# Patient Record
Sex: MALE | Race: WHITE | NOT HISPANIC OR LATINO | Employment: FULL TIME | URBAN - METROPOLITAN AREA
[De-identification: names, ages, dates, MRNs, and addresses within clinical notes are randomized per-mention and may not be internally consistent; named-entity substitution may affect disease eponyms.]

---

## 2019-07-06 ENCOUNTER — OFFICE VISIT (OUTPATIENT)
Dept: URGENT CARE | Facility: CLINIC | Age: 54
End: 2019-07-06
Payer: COMMERCIAL

## 2019-07-06 VITALS
BODY MASS INDEX: 34.27 KG/M2 | WEIGHT: 244.8 LBS | HEIGHT: 71 IN | DIASTOLIC BLOOD PRESSURE: 78 MMHG | HEART RATE: 47 BPM | OXYGEN SATURATION: 97 % | RESPIRATION RATE: 16 BRPM | SYSTOLIC BLOOD PRESSURE: 138 MMHG | TEMPERATURE: 97.4 F

## 2019-07-06 DIAGNOSIS — H60.91 OTITIS EXTERNA OF RIGHT EAR, UNSPECIFIED CHRONICITY, UNSPECIFIED TYPE: Primary | ICD-10-CM

## 2019-07-06 DIAGNOSIS — H65.03 BILATERAL ACUTE SEROUS OTITIS MEDIA, RECURRENCE NOT SPECIFIED: ICD-10-CM

## 2019-07-06 DIAGNOSIS — H61.21 IMPACTED CERUMEN OF RIGHT EAR: ICD-10-CM

## 2019-07-06 PROCEDURE — 99203 OFFICE O/P NEW LOW 30 MIN: CPT | Performed by: EMERGENCY MEDICINE

## 2019-07-06 PROCEDURE — 69209 REMOVE IMPACTED EAR WAX UNI: CPT | Performed by: EMERGENCY MEDICINE

## 2019-07-06 RX ORDER — NEOMYCIN SULFATE, POLYMYXIN B SULFATE AND HYDROCORTISONE 10; 3.5; 1 MG/ML; MG/ML; [USP'U]/ML
4 SUSPENSION/ DROPS AURICULAR (OTIC) 4 TIMES DAILY
Qty: 10 ML | Refills: 0 | Status: SHIPPED | OUTPATIENT
Start: 2019-07-06 | End: 2021-01-21 | Stop reason: ALTCHOICE

## 2019-07-06 NOTE — PATIENT INSTRUCTIONS
Ear drops - 4 drops R ear 4 x a day; May try Claritin D, Allegra D or Zyrtec D for congestion/ringing in ear, recheck as needed

## 2019-07-06 NOTE — PROGRESS NOTES
Assessment/Plan:    No problem-specific Assessment & Plan notes found for this encounter  Diagnoses and all orders for this visit:    Otitis externa of right ear, unspecified chronicity, unspecified type  -     neomycin-polymyxin-hydrocortisone (CORTISPORIN) 0 35%-10,000 units/mL-1% otic suspension; Administer 4 drops to the right ear 4 (four) times a day    Bilateral acute serous otitis media, recurrence not specified    Impacted cerumen of right ear    Other orders  -     Ear cerumen removal          Subjective:      Patient ID: Forest Clark is a 48 y o  male  Pain in R ear, ringing in both ears for several days    Earache    There is pain in the right ear  This is a new problem  The current episode started in the past 7 days  The problem occurs constantly  The problem has been unchanged  There has been no fever  The pain is at a severity of 3/10  The pain is mild  Associated symptoms comments: tinnitus  He has tried nothing for the symptoms  The treatment provided no relief  The following portions of the patient's history were reviewed and updated as appropriate: allergies, current medications, past family history, past medical history, past social history, past surgical history and problem list     Review of Systems   HENT: Positive for ear pain and tinnitus  All other systems reviewed and are negative  Objective:      /78   Pulse (!) 47   Temp (!) 97 4 °F (36 3 °C) (Tympanic)   Resp 16   Ht 5' 11" (1 803 m)   Wt 111 kg (244 lb 12 8 oz)   SpO2 97%   BMI 34 14 kg/m²          Physical Exam   Constitutional: He is oriented to person, place, and time  He appears well-developed and well-nourished  HENT:   Right Ear: There is drainage  A middle ear effusion is present  Decreased hearing is noted  Left Ear: External ear and ear canal normal  A middle ear effusion is present     Nose: Nose normal    R canal swollen, occluded with wax, exudate   Eyes: Pupils are equal, round, and reactive to light  Cardiovascular: Normal rate  Pulmonary/Chest: Effort normal    Neurological: He is alert and oriented to person, place, and time  Nursing note and vitals reviewed  Ear cerumen removal  Date/Time: 7/6/2019 1:35 PM  Performed by: Derotha Nageotte, MD  Authorized by: Derotha Nageotte, MD     Patient location:  Bedside  Other Assisting Provider: No    Consent:     Consent obtained:  Verbal    Alternatives discussed:  No treatment  Universal protocol:     Patient identity confirmed:  Verbally with patient  Procedure details:     Location:  R ear    Procedure type: irrigation      Approach:  Natural orifice  Post-procedure details:     Complication:  None    Hearing quality:  Improved    Patient tolerance of procedure:   Tolerated well, no immediate complications

## 2019-10-07 ENCOUNTER — APPOINTMENT (OUTPATIENT)
Dept: RADIOLOGY | Facility: CLINIC | Age: 54
End: 2019-10-07
Payer: COMMERCIAL

## 2019-10-07 VITALS
DIASTOLIC BLOOD PRESSURE: 84 MMHG | BODY MASS INDEX: 34.44 KG/M2 | WEIGHT: 246 LBS | HEIGHT: 71 IN | HEART RATE: 43 BPM | SYSTOLIC BLOOD PRESSURE: 146 MMHG

## 2019-10-07 DIAGNOSIS — M25.531 PAIN IN RIGHT WRIST: ICD-10-CM

## 2019-10-07 DIAGNOSIS — M19.031 ARTHRITIS OF RIGHT WRIST: Primary | ICD-10-CM

## 2019-10-07 PROCEDURE — 73110 X-RAY EXAM OF WRIST: CPT

## 2019-10-07 PROCEDURE — 99203 OFFICE O/P NEW LOW 30 MIN: CPT | Performed by: ORTHOPAEDIC SURGERY

## 2019-10-07 NOTE — PROGRESS NOTES
ASSESSMENT/PLAN:    Assessment:   Right STT joint arthritis     Plan:    The patient will be fitted with the right Comfort Cool brace today which she was instructed to wear for activities  After the patient was shown his Comfort Cool brace he decided he did not want this  He will meet with our hand therapist today for home exercise program for wrist range of motion strengthening as well as a hand based thumb spica splint to wear for heavier activities  We did discuss possible steroid injections with the patient would not like to proceed with today as well as a fusion if needed in the future  Follow Up:  PRN    To Do Next Visit:       General Discussions:     Osteoarthritis:  The anatomy and physiology of osteoarthritis was discussed with the patient today in the office  Deterioration of the articular cartilage eventually leads to altered mobility at the joint, resulting in joint subluxation, osteophyte formation, cystic changes, as well as subchondral sclerosis  Eventually, pain, limited mobility, and compensatory hypermobility at surrounding joints may develop  While normal activity and usage of the joint may provide a painful experience to the patient, this typically does not result in damage to the limb  Treatment options include splints to decreased joint edema, pain, and inflammation  Therapy exercises to strengthen the surrounding musculature may relieve pain, but do not alter the overall continued development of osteoarthritis  Oral medications, topical medications, corticosteroid injections may decrease pain and increase overall function  Eventually, some patients may require surgical intervention       Operative Discussions:         _____________________________________________________  CHIEF COMPLAINT:  Chief Complaint   Patient presents with    Right Wrist - Pain         SUBJECTIVE:  Ernestina Mortimer is a 48 y o  male who presents with Pain  Moderate  Intermittant  Sharp to the right wrist  This started  2 month(s) ago as Chronic  He denies numbness and tingling  Patient is a heavy  for work  Patient states that he has tried bracing in the past without relief  Radiation: Yes to the  wrist  Previous Treatments: NSAIDs with only partial relief  Associated symptoms: No Complaints    PAST MEDICAL HISTORY:  History reviewed  No pertinent past medical history  PAST SURGICAL HISTORY:  Past Surgical History:   Procedure Laterality Date    KNEE SURGERY      SHOULDER SURGERY         FAMILY HISTORY:  History reviewed  No pertinent family history  SOCIAL HISTORY:  Social History     Tobacco Use    Smoking status: Never Smoker    Smokeless tobacco: Never Used   Substance Use Topics    Alcohol use: Not Currently    Drug use: Not Currently       MEDICATIONS:    Current Outpatient Medications:     neomycin-polymyxin-hydrocortisone (CORTISPORIN) 0 35%-10,000 units/mL-1% otic suspension, Administer 4 drops to the right ear 4 (four) times a day (Patient not taking: Reported on 10/7/2019), Disp: 10 mL, Rfl: 0    ALLERGIES:  Allergies   Allergen Reactions    Etodolac Dizziness       REVIEW OF SYSTEMS:  Pertinent items are noted in HPI      LABS:  HgA1c: No results found for: HGBA1C  BMP: No results found for: GLUCOSE, CALCIUM, NA, K, CO2, CL, BUN, CREATININE      _____________________________________________________  PHYSICAL EXAMINATION:  Vital signs: /84   Pulse (!) 43   Ht 5' 11" (1 803 m)   Wt 112 kg (246 lb)   BMI 34 31 kg/m²   General: well developed and well nourished, alert, oriented times 3 and appears comfortable  Psychiatric: Normal  HEENT: Trachea Midline, No torticollis  Cardiovascular: No discernable arrhythmia  Pulmonary: No wheezing or stridor  Skin: No masses, erythema, lacerations, fluctation, ulcerations  Neurovascular: Sensation Intact to the Median, Ulnar, Radial Nerve, Motor Intact to the Median, Ulnar, Radial Nerve and Pulses Intact    MUSCULOSKELETAL EXAMINATION:  RIGHT SIDE:  Wrist:  Mild anatomical snuff box swelling, full wrist flexion and extension, negative Finkelstein, negative thumb cmc tenderness, tenderness STT jt      _____________________________________________________  STUDIES REVIEWED:  Images were reviewd in PACS: xray of right wrist on 10/7/19 demonstrates arthritic changes to STT joint arthritis with joint space narrowing and presence of a scaphoid cyst        PROCEDURES PERFORMED:  Procedures  No Procedures performed today   Scribe Attestation    I,:   Angelique Calles am acting as a scribe while in the presence of the attending physician :        I,:   Pearl Almazan MD personally performed the services described in this documentation    as scribed in my presence :

## 2019-10-14 ENCOUNTER — OFFICE VISIT (OUTPATIENT)
Dept: OCCUPATIONAL THERAPY | Facility: CLINIC | Age: 54
End: 2019-10-14
Payer: COMMERCIAL

## 2019-10-14 DIAGNOSIS — M19.031 ARTHRITIS OF RIGHT WRIST: ICD-10-CM

## 2019-10-14 PROCEDURE — 97760 ORTHOTIC MGMT&TRAING 1ST ENC: CPT | Performed by: OCCUPATIONAL THERAPIST

## 2019-10-14 NOTE — PROGRESS NOTES
Orthosis    Diagnosis:   1  Arthritis of right wrist  Ambulatory referral to PT/OT hand therapy    STT joint     Indication: Joint Protection and Arthritic Condition    Location: Right  thumb  Supplies: Custom Fit Orthotic  Orthosis type: Hand-Based SPICA  Wearing Schedule: As Needed  Describe Position: function    Precautions: Universal (skin contact/breakdown)    Patient or Caregiver expresses understanding of wearing Schedule and Precautions? Yes  Patient or Caregiver able to don/doff orthotic independently? Yes    Written orders provided to patient?  Yes  Orders Obtained: Written  Orders Obtained from: Dr Mia Cui    Return for evaluation and treatment No

## 2020-02-05 ENCOUNTER — OFFICE VISIT (OUTPATIENT)
Dept: URGENT CARE | Facility: CLINIC | Age: 55
End: 2020-02-05
Payer: COMMERCIAL

## 2020-02-05 VITALS
BODY MASS INDEX: 33.86 KG/M2 | DIASTOLIC BLOOD PRESSURE: 80 MMHG | WEIGHT: 250 LBS | HEIGHT: 72 IN | OXYGEN SATURATION: 98 % | SYSTOLIC BLOOD PRESSURE: 138 MMHG | RESPIRATION RATE: 16 BRPM | TEMPERATURE: 97.6 F | HEART RATE: 51 BPM

## 2020-02-05 DIAGNOSIS — H60.501 ACUTE OTITIS EXTERNA OF RIGHT EAR, UNSPECIFIED TYPE: Primary | ICD-10-CM

## 2020-02-05 PROCEDURE — 99213 OFFICE O/P EST LOW 20 MIN: CPT | Performed by: PHYSICIAN ASSISTANT

## 2020-02-05 RX ORDER — CIPROFLOXACIN AND DEXAMETHASONE 3; 1 MG/ML; MG/ML
4 SUSPENSION/ DROPS AURICULAR (OTIC) 2 TIMES DAILY
Qty: 7.5 ML | Refills: 0 | Status: SHIPPED | OUTPATIENT
Start: 2020-02-05 | End: 2021-01-21 | Stop reason: ALTCHOICE

## 2020-02-05 RX ORDER — CIPROFLOXACIN AND DEXAMETHASONE 3; 1 MG/ML; MG/ML
4 SUSPENSION/ DROPS AURICULAR (OTIC) 2 TIMES DAILY
Qty: 7.5 ML | Refills: 0 | Status: SHIPPED | OUTPATIENT
Start: 2020-02-05 | End: 2020-02-05

## 2020-02-05 NOTE — PROGRESS NOTES
NAME: Josafat Peña is a 48 y o  male  : 1965    MRN: [id number]      Assessment and Plan   Acute otitis externa of right ear, unspecified type [H60 501]  1  Acute otitis externa of right ear, unspecified type  ciprofloxacin-dexamethasone (CIPRODEX) otic suspension    Exam findings consistent with otitis externa  At this time will provide patient with Ciprodex  Take medication as noted  Advised patient to keep the ears dry  Recommend use of cotton ball while in shower  Recommended use of OTC Tylenol or ibuprofen for pain  Patient understands and agrees with treatment plans  Tereza Marquez was seen today for earache  Diagnoses and all orders for this visit:    Acute otitis externa of right ear, unspecified type  -     ciprofloxacin-dexamethasone (CIPRODEX) otic suspension; Administer 4 drops to the right ear 2 (two) times a day for 7 days        Patient Instructions   There are no Patient Instructions on file for this visit  Proceed to ER if symptoms worsen  Chief Complaint     Chief Complaint   Patient presents with    Earache     Pt reports right ear pain x1 week  History of Present Illness     55yo Pt presents c/o right ear infection x 1 wk  Pt reports ear pain rated 3-4/10  Pt admits to Has taken OTC hydrogen peroxide  Pt admits to itchy ear, ringing in ear    Denies discharge from ear  Denies trouble hearing  Denies nasal congestion, rhinorrhea, sinus pressure, sore throat, cough  Denies chest tightness, chest pain, SOB, n/v/d  Admits History of ear infection        Review of Systems   Review of Systems   Constitutional: Negative  HENT: Positive for ear pain (Right)  Respiratory: Negative  Cardiovascular: Negative            Current Medications       Current Outpatient Medications:     ciprofloxacin-dexamethasone (CIPRODEX) otic suspension, Administer 4 drops to the right ear 2 (two) times a day for 7 days, Disp: 7 5 mL, Rfl: 0    neomycin-polymyxin-hydrocortisone (CORTISPORIN) 0 35%-10,000 units/mL-1% otic suspension, Administer 4 drops to the right ear 4 (four) times a day (Patient not taking: Reported on 10/7/2019), Disp: 10 mL, Rfl: 0    Current Allergies     Allergies as of 02/05/2020 - Reviewed 02/05/2020   Allergen Reaction Noted    Etodolac Dizziness 03/12/2010              No past medical history on file  Past Surgical History:   Procedure Laterality Date    KNEE SURGERY      SHOULDER SURGERY         No family history on file  Medications have been verified  The following portions of the patient's history were reviewed and updated as appropriate: allergies, current medications, past family history, past medical history, past social history, past surgical history and problem list     Objective   /80   Pulse (!) 51   Temp 97 6 °F (36 4 °C)   Resp 16   Ht 6' (1 829 m)   Wt 113 kg (250 lb)   SpO2 98%   BMI 33 91 kg/m²      Physical Exam     Physical Exam   Constitutional: He appears well-developed  No distress  HENT:   Head: Normocephalic  Right Ear: Hearing and tympanic membrane normal  There is swelling and tenderness  Left Ear: Hearing, tympanic membrane, external ear and ear canal normal    Ears:    Nose: Nose normal    Cardiovascular: Normal rate, regular rhythm, normal heart sounds and intact distal pulses  Exam reveals no gallop and no friction rub  No murmur heard  Pulmonary/Chest: Effort normal and breath sounds normal  No stridor  No respiratory distress  He has no wheezes  He has no rales  He exhibits no tenderness  Nursing note and vitals reviewed        Rima Ramos PA-C

## 2021-01-20 ENCOUNTER — HOSPITAL ENCOUNTER (EMERGENCY)
Facility: HOSPITAL | Age: 56
Discharge: HOME | End: 2021-01-20
Attending: EMERGENCY MEDICINE | Admitting: EMERGENCY MEDICINE
Payer: COMMERCIAL

## 2021-01-20 ENCOUNTER — OFFICE VISIT (OUTPATIENT)
Dept: URGENT CARE | Facility: CLINIC | Age: 56
End: 2021-01-20
Payer: COMMERCIAL

## 2021-01-20 ENCOUNTER — APPOINTMENT (EMERGENCY)
Dept: NON INVASIVE DIAGNOSTICS | Facility: HOSPITAL | Age: 56
End: 2021-01-20
Payer: COMMERCIAL

## 2021-01-20 VITALS
BODY MASS INDEX: 33.86 KG/M2 | RESPIRATION RATE: 16 BRPM | HEIGHT: 72 IN | SYSTOLIC BLOOD PRESSURE: 134 MMHG | HEART RATE: 50 BPM | TEMPERATURE: 97.9 F | DIASTOLIC BLOOD PRESSURE: 82 MMHG | OXYGEN SATURATION: 99 % | WEIGHT: 250 LBS

## 2021-01-20 VITALS
WEIGHT: 250 LBS | TEMPERATURE: 98 F | OXYGEN SATURATION: 97 % | SYSTOLIC BLOOD PRESSURE: 119 MMHG | DIASTOLIC BLOOD PRESSURE: 70 MMHG | HEART RATE: 57 BPM | RESPIRATION RATE: 16 BRPM | BODY MASS INDEX: 33.91 KG/M2

## 2021-01-20 DIAGNOSIS — R60.0 EDEMA OF RIGHT LOWER LEG DUE TO PERIPHERAL VENOUS INSUFFICIENCY: ICD-10-CM

## 2021-01-20 DIAGNOSIS — M79.604 RIGHT LEG PAIN: Primary | ICD-10-CM

## 2021-01-20 DIAGNOSIS — M79.609 PAIN IN LIMB: ICD-10-CM

## 2021-01-20 DIAGNOSIS — I87.2 EDEMA OF RIGHT LOWER LEG DUE TO PERIPHERAL VENOUS INSUFFICIENCY: ICD-10-CM

## 2021-01-20 PROBLEM — I83.891 SYMPTOMATIC VARICOSE VEINS OF RIGHT LOWER EXTREMITY: Status: ACTIVE | Noted: 2021-01-20

## 2021-01-20 PROCEDURE — 93971 EXTREMITY STUDY: CPT

## 2021-01-20 PROCEDURE — 99283 EMERGENCY DEPT VISIT LOW MDM: CPT

## 2021-01-20 PROCEDURE — 93971 EXTREMITY STUDY: CPT | Performed by: INTERNAL MEDICINE

## 2021-01-20 PROCEDURE — 99213 OFFICE O/P EST LOW 20 MIN: CPT | Performed by: NURSE PRACTITIONER

## 2021-01-20 PROCEDURE — 99284 EMERGENCY DEPT VISIT MOD MDM: CPT | Performed by: EMERGENCY MEDICINE

## 2021-01-20 NOTE — ED PROVIDER NOTES
History  Chief Complaint   Patient presents with    Leg Pain     c/o an episode of right leg pain last night around 2200  Pt states leg is sore today  53-year-old male with history of chronic lower extremity venous insufficiency presents for evaluation of worsening pain in his right medial thigh that started last night  Patient reports he has a prior history of leg pain secondary to his venous insufficiency and has had a negative DVT study in the past   Patient states this episode started last night, sudden and was much more severe than his typical episodes  Patient states the pain has improved this morning but is still present mildly  Denies chest pain, shortness of breath  Prior to Admission Medications   Prescriptions Last Dose Informant Patient Reported? Taking?   ciprofloxacin-dexamethasone (CIPRODEX) otic suspension   No No   Sig: Administer 4 drops to the right ear 2 (two) times a day for 7 days   neomycin-polymyxin-hydrocortisone (CORTISPORIN) 0 35%-10,000 units/mL-1% otic suspension   No No   Sig: Administer 4 drops to the right ear 4 (four) times a day   Patient not taking: Reported on 10/7/2019      Facility-Administered Medications: None       History reviewed  No pertinent past medical history  Past Surgical History:   Procedure Laterality Date    KNEE SURGERY      SHOULDER SURGERY         History reviewed  No pertinent family history  I have reviewed and agree with the history as documented  E-Cigarette/Vaping    E-Cigarette Use Never User      E-Cigarette/Vaping Substances    Nicotine No     THC No     CBD No     Flavoring No     Other No     Unknown No      Social History     Tobacco Use    Smoking status: Never Smoker    Smokeless tobacco: Never Used   Substance Use Topics    Alcohol use: Not Currently    Drug use: Not Currently       Review of Systems   Respiratory: Negative for shortness of breath  Cardiovascular: Negative for chest pain  Musculoskeletal:        Right medial thigh pain   Skin: Negative for color change and wound  All other systems reviewed and are negative  Physical Exam  Physical Exam  Vitals signs and nursing note reviewed  Constitutional:       Appearance: He is well-developed  HENT:      Head: Normocephalic and atraumatic  Eyes:      Conjunctiva/sclera: Conjunctivae normal    Neck:      Musculoskeletal: Normal range of motion and neck supple  Cardiovascular:      Rate and Rhythm: Normal rate and regular rhythm  Pulmonary:      Effort: Pulmonary effort is normal  No respiratory distress  Abdominal:      Palpations: Abdomen is soft  Tenderness: There is no abdominal tenderness  Musculoskeletal: Normal range of motion  General: Tenderness present  Comments: Tender to palpation of right medial thigh  Nontender in calf or popliteal region  No overlying skin changes  Intact distal pulses  Skin:     General: Skin is warm  Findings: No erythema  Neurological:      Mental Status: He is alert and oriented to person, place, and time     Psychiatric:         Behavior: Behavior normal          Vital Signs  ED Triage Vitals [01/20/21 1629]   Temperature Pulse Respirations Blood Pressure SpO2   98 °F (36 7 °C) 57 16 119/70 97 %      Temp Source Heart Rate Source Patient Position - Orthostatic VS BP Location FiO2 (%)   Tympanic Monitor Lying Right arm --      Pain Score       1           Vitals:    01/20/21 1629   BP: 119/70   Pulse: 57   Patient Position - Orthostatic VS: Lying         Visual Acuity      ED Medications  Medications - No data to display    Diagnostic Studies  Results Reviewed     None                 VAS lower limb venous duplex study, unilateral/limited    (Results Pending)              Procedures  Procedures         ED Course                             SBIRT 22yo+      Most Recent Value   SBIRT (22 yo +)   In order to provide better care to our patients, we are screening all of our patients for alcohol and drug use  Would it be okay to ask you these screening questions? Yes Filed at: 01/20/2021 1648   Initial Alcohol Screen: US AUDIT-C    1  How often do you have a drink containing alcohol?  0 Filed at: 01/20/2021 1648   2  How many drinks containing alcohol do you have on a typical day you are drinking? 0 Filed at: 01/20/2021 1648   3a  Male UNDER 65: How often do you have five or more drinks on one occasion? 0 Filed at: 01/20/2021 1648   3b  FEMALE Any Age, or MALE 65+: How often do you have 4 or more drinks on one occassion? 0 Filed at: 01/20/2021 1648   Audit-C Score  0 Filed at: 01/20/2021 1648   ALISA: How many times in the past year have you    Used an illegal drug or used a prescription medication for non-medical reasons? Never Filed at: 01/20/2021 1648                    MDM  Number of Diagnoses or Management Options  Diagnosis management comments: 72-year-old male with unilateral leg pain  Obtain duplex assess DVT  Offered pain control patient declined at this time  Disposition  Final diagnoses:   Right leg pain     Time reflects when diagnosis was documented in both MDM as applicable and the Disposition within this note     Time User Action Codes Description Comment    1/20/2021  5:42 PM Wilhemenia Scale Add [M79 609] Pain in limb     1/20/2021  5:58  [address] [A26 785] Right leg pain       ED Disposition     ED Disposition Condition Date/Time Comment    Discharge Stable Wed Jan 20, 2021  5:58 PM Amanda Perez discharge to home/self care              Follow-up Information     Follow up With Specialties Details Why [address] [place] [address] Rd Vascular Surgery   [address] [facility] [id number]  [phone] The [address] [place] [address] Rd, [address] [place], [place], [address] [place] [address], [place] [address] Emergency Department Emergency Medicine  If symptoms worsen [address] [place] [address]  [address] [facility] Department, [address] [facility], [address]          Patient's Medications   Discharge Prescriptions    No medications on file     No discharge procedures on file      PDMP Review     None          ED Provider  Electronically Signed by           Faustino Ji DO  01/20/21 9468

## 2021-01-20 NOTE — PATIENT INSTRUCTIONS
I recommend being seen in the ER today to rule out a blood clot  Even though you have had chronic issues for the last 10 years, the significant pain last night is brand new  Your other scan was 10 years ago  The new symptoms definitely warrant another evaluation and scan to make sure this is not a clot  Clots can be life-threatening since they can go to dangerous placed (brain, heart, lungs, etc)  If you do not have a DVT and/or if you want to follow-up re: the chronic venous issues, see vascular as discussed  Deep Vein Thrombosis   AMBULATORY CARE:   A deep vein thrombosis (DVT)  is a blood clot that forms in a deep vein of the body  The deep veins in the legs, thighs, and hips are the most common sites for DVT  A DVT can also occur in a deep vein within your arms  The clot prevents the normal flow of blood in the vein  The blood backs up and causes pain and swelling  The DVT can break into smaller pieces and travel to your lungs and cause a blockage called a pulmonary embolism  A pulmonary embolism can become life-threatening  Common symptoms include the following:   · Swelling    · Redness    · Warmth, pain, or tenderness     Call your local emergency number (911 in the [address] [facility]) if:   · You feel lightheaded, short of breath, and have chest pain  · You cough up blood  Call your doctor if:   · Your arm or leg feels warm, tender, and painful  It may look swollen and red  · You have questions or concerns about your condition or care  Treatment for a DVT  may include any of the following:  · Blood thinners  help prevent blood clots  Clots can cause strokes, heart attacks, and death  The following are general safety guidelines to follow while you are taking a blood thinner:    ? Watch for bleeding and bruising while you take blood thinners  Watch for bleeding from your gums or nose  Watch for blood in your urine and bowel movements   Use a soft washcloth on your skin, and a soft toothbrush to brush your teeth  This can keep your skin and gums from bleeding  If you shave, use an electric shaver  Do not play contact sports  ? Tell your dentist and other healthcare providers that you take a blood thinner  Wear a bracelet or necklace that says you take this medicine  ? Do not start or stop any other medicines unless your healthcare provider tells you to  Many medicines cannot be used with blood thinners  ? Take your blood thinner exactly as prescribed by your healthcare provider  Do not skip does or take less than prescribed  Tell your provider right away if you forget to take your blood thinner, or if you take too much  ? Warfarin  is a blood thinner that you may need to take  The following are things you should be aware of if you take warfarin:     § Foods and medicines can affect the amount of warfarin in your blood  Do not make major changes to your diet while you take warfarin  Warfarin works best when you eat about the same amount of vitamin K every day  Vitamin K is found in green leafy vegetables and certain other foods  Ask for more information about what to eat when you are taking warfarin  § You will need to see your healthcare provider for follow-up visits when you are on warfarin  You will need regular blood tests  These tests are used to decide how much medicine you need  · Clot busters  are emergency medicines that work to dissolve blood clots  · A vena cava filter  may be placed inside your vena cava to treat your DVT  The vena cava is a large vein that brings blood from your lower body up to your heart  The filter may help trap pieces of a blood clot and prevent them from going into your lungs  · Surgery  called a thrombectomy may be done to remove the clot  A procedure called thrombolysis may instead be done to inject a clot buster that helps break the clot apart  Manage a DVT:   · Wear pressure stockings as directed  The stockings put pressure on your legs   This improves blood flow and helps prevent clots  Wear the stockings during the day  Do not wear them when you sleep  · Elevate your legs above the level of your heart  Elevate your legs when you sit or lie down, as often as you can  This will help decrease swelling and pain  Prop your legs on pillows or blankets to keep them elevated comfortably  Prevent a DVT:   · Exercise regularly to help increase your blood flow  Walking is a good low-impact exercise  Talk to your healthcare provider about the best exercise plan for you  · Change your body position or move around often  Move and stretch in your seat several times each hour if you travel by car or work at a desk  In an airplane, get up and walk every hour  Move your legs by tightening and releasing your leg muscles while sitting  You can move your legs while sitting by raising and lowering your heels  Keep your toes on the floor while you do this  You can also raise and lower your toes while keeping your heels on the floor  · Maintain a healthy weight  Ask your healthcare provider how much you should weigh  Ask him or her to help you create a weight loss plan if you are overweight  · Do not smoke  Nicotine and other chemicals in cigarettes and cigars can damage blood vessels and make it more difficult to manage your DVT  Ask your healthcare provider for information if you currently smoke and need help to quit  E-cigarettes or smokeless tobacco still contain nicotine  Talk to your healthcare provider before you use these products  · Ask about birth control if you are a woman who takes the pill  A birth control pill increases the risk for PE in certain women  The risk is higher if you are also older than 35, smoke cigarettes, or have a blood clotting disorder  Talk to your healthcare provider about other ways to prevent pregnancy, such as a cervical cap or intrauterine device (IUD)      Follow up with your doctor or specialist as directed: You may need to come in regularly for scans to check for blood clots  Your blood may checked to see how long it takes to clot  Your doctor or specialist will tell you if you need to have this test and how often to have it  Write down your questions so you remember to ask them during your visits  © Copyright [address] Information is for End User's use only and may not be sold, redistributed or otherwise used for commercial purposes  All illustrations and images included in CareNotes® are the copyrighted property of [employer]  or [facility]   The above information is an  only  It is not intended as medical advice for individual conditions or treatments  Talk to your doctor, nurse or pharmacist before following any medical regimen to see if it is safe and effective for you  Venous Insufficiency   AMBULATORY CARE:   Venous insufficiency  is a condition that prevents blood from flowing out of your legs and back to your heart  Veins contain valves that help blood flow in one direction  Venous insufficiency means the valves do not close correctly or fully  Blood flows back and pools in your leg  This can cause problems such as varicose veins  Venous insufficiency may also be called chronic venous insufficiency or venous stasis  Common signs and symptoms:   · Visible veins on your legs that may be small and red or large, thick, and blue    · Swelling in your ankles or calves    · Changes in skin color, such as dark or purple skin    · An ulcer (open sore) on your leg    · Leg pain that is worse when you are menstruating (women) or when you stand, and better when you elevate your legs    · Burning or itching    · Cramps that happen at night    · Thick, hard skin on your legs and ankles    · Feeling of heaviness in your legs    Seek care immediately if:   · You have a wound that does not heal or is infected      · You have an injury that has broken your skin and caused your varicose veins to bleed  · Your leg is swollen and hard  · You have pain in your leg that does not go away or gets worse  · Your legs or feet are turning blue or black  · Your leg feels warm, tender, and painful  It may look swollen and red  Contact your healthcare provider if:   · You have a fever  · You have varicose veins and they are painful  · You have new or worsening leg pain, swelling, or redness  · You have new or worsening ulcers or other sores on your leg  · You have questions or concerns about your condition or care  Treatment  may include any of the following:  · Medicine  may be given to improve blood flow  The medicines may thin your blood or reduce swelling to help blood flow  You may also need medicine to treat a bacterial infection  · Ablation  is a procedure used to close varicose veins  A catheter is guided until it is near the vein  A device will then be guided to the area  The device may produce energy through radiofrequency or a laser  The energy creates heat that will close the blood vessel  · Sclerotherapy  is a procedure used to fade visible veins  Your healthcare provider will inject a liquid into a spider vein or varicose vein  The liquid causes irritation in the vein  The vein swells and sticks together  Your body will then absorb the vein  · Surgery  may be needed if other treatments do not work  Surgery may be used to repair a leg vein valve or to clip or tie off a vein so blood cannot flow through it  You may need to have a veins removed during surgery called stripping  Surgery may be used to bypass (go around) the damaged vein  Blood will flow through a vein transplanted from another part of your body  Manage your symptoms:   · Wear pressure stockings as directed  Pressure stockings help keep blood from pooling in your leg veins  Your healthcare provider can prescribe stockings that are right for you   Do not buy over-the-counter pressure stockings unless your healthcare provider says it is okay  They may not fit correctly or may have elastic that cuts off your circulation  Ask your healthcare provider when to start wearing pressure stockings and how long to wear them each day  · Do not sit or stand for long periods of time  If you have to sit for a long time, flex and extend your legs, feet, and ankles  Do this about 10 times every 30 minutes to help keep blood flowing  If you have to stand for a long time, take breaks and sit with your legs elevated  · Elevate your legs  Elevate your legs above the level of your heart to reduce swelling  Your healthcare provider may recommend that you keep your legs elevated for 30 minutes at a time  You may need to do this 3 to 4 times per day, or more if your healthcare provider recommends  · Do not smoke  Nicotine and other chemicals in cigarettes and cigars can cause blood vessel damage  Ask your healthcare provider for information if you currently smoke and need help to quit  E-cigarettes or smokeless tobacco still contain nicotine  Talk to your healthcare provider before you use these products  · Reach or maintain a healthy weight  Extra weight can make venous insufficiency worse  Ask your healthcare provider how much you should weigh  He can help you create a weight loss plan if you need to lose weight  · Exercise as directed  Walking can help increase blood flow in your calves  Ask your healthcare provider how much exercise you need each day and which exercises are best for you  · Care for your skin  Keep your skin clean  Do not use any soaps or lotions that may dry your skin  For example, do not use products that contain fragrance or alcohol  If you have a skin ulcer, your healthcare provider may recommend a wet-to-dry bandage  To do this, apply a wet bandage to your wound and allow it to dry   This will help remove drainage from your wound each time you change the bandage  Your healthcare provider will tell you how often to change your bandage and which kind of bandage to use  Check your wound for signs of infection, such as swelling or pus  · Go to physical therapy (PT) as directed  A physical therapist can help you increase movement and range of motion in your legs  Follow up with your healthcare provider as directed:  Write down your questions so you remember to ask them during your visits  © Copyright [address] Information is for End User's use only and may not be sold, redistributed or otherwise used for commercial purposes  All illustrations and images included in CareNotes® are the copyrighted property of [employer]  or [address] [place] [address]  The above information is an  only  It is not intended as medical advice for individual conditions or treatments  Talk to your doctor, nurse or pharmacist before following any medical regimen to see if it is safe and effective for you

## 2021-01-20 NOTE — PROGRESS NOTES
[employer] [employer]        NAME: Nicolas Blount is a 50 y o  male  : 1965    MRN: [id number]  DATE: 2021  TIME: 11:26 AM    Assessment and Plan   Right leg pain [M79 [id number]]  1  Right leg pain  Ambulatory referral to Vascular Surgery   2  Edema of right lower leg due to peripheral venous insufficiency  Ambulatory referral to Vascular Surgery         Patient Instructions     Patient Instructions     I recommend being seen in the ER today to rule out a blood clot  Even though you have had chronic issues for the last 10 years, the significant pain last night is brand new  Your other scan was 10 years ago  The new symptoms definitely warrant another evaluation and scan to make sure this is not a clot  Clots can be life-threatening since they can go to dangerous placed (brain, heart, lungs, etc)  If you do not have a DVT and/or if you want to follow-up re: the chronic venous issues, see vascular as discussed  Deep Vein Thrombosis   AMBULATORY CARE:   A deep vein thrombosis (DVT)  is a blood clot that forms in a deep vein of the body  The deep veins in the legs, thighs, and hips are the most common sites for DVT  A DVT can also occur in a deep vein within your arms  The clot prevents the normal flow of blood in the vein  The blood backs up and causes pain and swelling  The DVT can break into smaller pieces and travel to your lungs and cause a blockage called a pulmonary embolism  A pulmonary embolism can become life-threatening  Common symptoms include the following:   · Swelling    · Redness    · Warmth, pain, or tenderness     Call your local emergency number (911 in the [address] [facility]) if:   · You feel lightheaded, short of breath, and have chest pain  · You cough up blood  Call your doctor if:   · Your arm or leg feels warm, tender, and painful  It may look swollen and red  · You have questions or concerns about your condition or care      Treatment for a DVT  may include any of the following:  · Blood thinners  help prevent blood clots  Clots can cause strokes, heart attacks, and death  The following are general safety guidelines to follow while you are taking a blood thinner:    ? Watch for bleeding and bruising while you take blood thinners  Watch for bleeding from your gums or nose  Watch for blood in your urine and bowel movements  Use a soft washcloth on your skin, and a soft toothbrush to brush your teeth  This can keep your skin and gums from bleeding  If you shave, use an electric shaver  Do not play contact sports  ? Tell your dentist and other healthcare providers that you take a blood thinner  Wear a bracelet or necklace that says you take this medicine  ? Do not start or stop any other medicines unless your healthcare provider tells you to  Many medicines cannot be used with blood thinners  ? Take your blood thinner exactly as prescribed by your healthcare provider  Do not skip does or take less than prescribed  Tell your provider right away if you forget to take your blood thinner, or if you take too much  ? Warfarin  is a blood thinner that you may need to take  The following are things you should be aware of if you take warfarin:     § Foods and medicines can affect the amount of warfarin in your blood  Do not make major changes to your diet while you take warfarin  Warfarin works best when you eat about the same amount of vitamin K every day  Vitamin K is found in green leafy vegetables and certain other foods  Ask for more information about what to eat when you are taking warfarin  § You will need to see your healthcare provider for follow-up visits when you are on warfarin  You will need regular blood tests  These tests are used to decide how much medicine you need  · Clot busters  are emergency medicines that work to dissolve blood clots  · A vena cava filter  may be placed inside your vena cava to treat your DVT   The vena cava is a large vein that brings blood from your lower body up to your heart  The filter may help trap pieces of a blood clot and prevent them from going into your lungs  · Surgery  called a thrombectomy may be done to remove the clot  A procedure called thrombolysis may instead be done to inject a clot buster that helps break the clot apart  Manage a DVT:   · Wear pressure stockings as directed  The stockings put pressure on your legs  This improves blood flow and helps prevent clots  Wear the stockings during the day  Do not wear them when you sleep  · Elevate your legs above the level of your heart  Elevate your legs when you sit or lie down, as often as you can  This will help decrease swelling and pain  Prop your legs on pillows or blankets to keep them elevated comfortably  Prevent a DVT:   · Exercise regularly to help increase your blood flow  Walking is a good low-impact exercise  Talk to your healthcare provider about the best exercise plan for you  · Change your body position or move around often  Move and stretch in your seat several times each hour if you travel by car or work at a desk  In an airplane, get up and walk every hour  Move your legs by tightening and releasing your leg muscles while sitting  You can move your legs while sitting by raising and lowering your heels  Keep your toes on the floor while you do this  You can also raise and lower your toes while keeping your heels on the floor  · Maintain a healthy weight  Ask your healthcare provider how much you should weigh  Ask him or her to help you create a weight loss plan if you are overweight  · Do not smoke  Nicotine and other chemicals in cigarettes and cigars can damage blood vessels and make it more difficult to manage your DVT  Ask your healthcare provider for information if you currently smoke and need help to quit  E-cigarettes or smokeless tobacco still contain nicotine   Talk to your healthcare provider before you use these products  · Ask about birth control if you are a woman who takes the pill  A birth control pill increases the risk for PE in certain women  The risk is higher if you are also older than 35, smoke cigarettes, or have a blood clotting disorder  Talk to your healthcare provider about other ways to prevent pregnancy, such as a cervical cap or intrauterine device (IUD)  Follow up with your doctor or specialist as directed: You may need to come in regularly for scans to check for blood clots  Your blood may checked to see how long it takes to clot  Your doctor or specialist will tell you if you need to have this test and how often to have it  Write down your questions so you remember to ask them during your visits  © Copyright [address] Information is for End User's use only and may not be sold, redistributed or otherwise used for commercial purposes  All illustrations and images included in CareNotes® are the copyrighted property of [employer] D [employer] M , Inc  or [facility] [employer]   The above information is an  only  It is not intended as medical advice for individual conditions or treatments  Talk to your doctor, nurse or pharmacist before following any medical regimen to see if it is safe and effective for you  Venous Insufficiency   AMBULATORY CARE:   Venous insufficiency  is a condition that prevents blood from flowing out of your legs and back to your heart  Veins contain valves that help blood flow in one direction  Venous insufficiency means the valves do not close correctly or fully  Blood flows back and pools in your leg  This can cause problems such as varicose veins  Venous insufficiency may also be called chronic venous insufficiency or venous stasis    Common signs and symptoms:   · Visible veins on your legs that may be small and red or large, thick, and blue    · Swelling in your ankles or calves    · Changes in skin color, such as dark or purple skin    · An ulcer (open sore) on your leg    · Leg pain that is worse when you are menstruating (women) or when you stand, and better when you elevate your legs    · Burning or itching    · Cramps that happen at night    · Thick, hard skin on your legs and ankles    · Feeling of heaviness in your legs    Seek care immediately if:   · You have a wound that does not heal or is infected  · You have an injury that has broken your skin and caused your varicose veins to bleed  · Your leg is swollen and hard  · You have pain in your leg that does not go away or gets worse  · Your legs or feet are turning blue or black  · Your leg feels warm, tender, and painful  It may look swollen and red  Contact your healthcare provider if:   · You have a fever  · You have varicose veins and they are painful  · You have new or worsening leg pain, swelling, or redness  · You have new or worsening ulcers or other sores on your leg  · You have questions or concerns about your condition or care  Treatment  may include any of the following:  · Medicine  may be given to improve blood flow  The medicines may thin your blood or reduce swelling to help blood flow  You may also need medicine to treat a bacterial infection  · Ablation  is a procedure used to close varicose veins  A catheter is guided until it is near the vein  A device will then be guided to the area  The device may produce energy through radiofrequency or a laser  The energy creates heat that will close the blood vessel  · Sclerotherapy  is a procedure used to fade visible veins  Your healthcare provider will inject a liquid into a spider vein or varicose vein  The liquid causes irritation in the vein  The vein swells and sticks together  Your body will then absorb the vein  · Surgery  may be needed if other treatments do not work  Surgery may be used to repair a leg vein valve or to clip or tie off a vein so blood cannot flow through it   You may need to have a veins removed during surgery called stripping  Surgery may be used to bypass (go around) the damaged vein  Blood will flow through a vein transplanted from another part of your body  Manage your symptoms:   · Wear pressure stockings as directed  Pressure stockings help keep blood from pooling in your leg veins  Your healthcare provider can prescribe stockings that are right for you  Do not buy over-the-counter pressure stockings unless your healthcare provider says it is okay  They may not fit correctly or may have elastic that cuts off your circulation  Ask your healthcare provider when to start wearing pressure stockings and how long to wear them each day  · Do not sit or stand for long periods of time  If you have to sit for a long time, flex and extend your legs, feet, and ankles  Do this about 10 times every 30 minutes to help keep blood flowing  If you have to stand for a long time, take breaks and sit with your legs elevated  · Elevate your legs  Elevate your legs above the level of your heart to reduce swelling  Your healthcare provider may recommend that you keep your legs elevated for 30 minutes at a time  You may need to do this 3 to 4 times per day, or more if your healthcare provider recommends  · Do not smoke  Nicotine and other chemicals in cigarettes and cigars can cause blood vessel damage  Ask your healthcare provider for information if you currently smoke and need help to quit  E-cigarettes or smokeless tobacco still contain nicotine  Talk to your healthcare provider before you use these products  · Reach or maintain a healthy weight  Extra weight can make venous insufficiency worse  Ask your healthcare provider how much you should weigh  He can help you create a weight loss plan if you need to lose weight  · Exercise as directed  Walking can help increase blood flow in your calves   Ask your healthcare provider how much exercise you need each day and which exercises are best for you  · Care for your skin  Keep your skin clean  Do not use any soaps or lotions that may dry your skin  For example, do not use products that contain fragrance or alcohol  If you have a skin ulcer, your healthcare provider may recommend a wet-to-dry bandage  To do this, apply a wet bandage to your wound and allow it to dry  This will help remove drainage from your wound each time you change the bandage  Your healthcare provider will tell you how often to change your bandage and which kind of bandage to use  Check your wound for signs of infection, such as swelling or pus  · Go to physical therapy (PT) as directed  A physical therapist can help you increase movement and range of motion in your legs  Follow up with your healthcare provider as directed:  Write down your questions so you remember to ask them during your visits  © Copyright [address] [place] [address] Drive Information is for End User's use only and may not be sold, redistributed or otherwise used for commercial purposes  All illustrations and images included in CareNotes® are the copyrighted property of [employer] M , Inc  or [address] [place] [address]  The above information is an  only  It is not intended as medical advice for individual conditions or treatments  Talk to your doctor, nurse or pharmacist before following any medical regimen to see if it is safe and effective for you  Follow up with PCP in 3-5 days  Proceed to  ER if symptoms worsen  Chief Complaint     Chief Complaint   Patient presents with    Leg Pain     Pt reports right inner thigh pain that began last night  Reports history of venous problems in his right leg  History of Present Illness        Patient presents concerned about increased right thigh pain  He notes that he has had a longstanding history of venous insufficiency in his right leg    He 1st had a lump with discomfort approximately 10 years ago and was sent by his PCP to the ER where he had ultrasound  The ultrasound was negative for clot, but it did show that the being basically did not run straight but rather would wind around causing blood to pool  He notes that he has venous issues in both legs, worse on the right  He states going up he lived and worked [place] which involved lots of kneeling down and weeding  Since he is right handed, he spent more time kneeling on his right leg  He has had venous lumpiness in that leg for years with a stable lump just above the knee on the right inner thigh  However, last night around 10 pm or so, he experienced sudden significant pain in his right inner thigh over that lump  He states it lasted at least 20 min before he felt able to move, then he sat at the side of the bed for another 5 min  He also reports feeling flushed during this episode  He states that this has never happened before  He did try to reach out to his PCP this morning but has not heard back yet  Concerned, he presents here  We discussed scope of urgent care vs PCP re: ordering outpatient diagnostics or not  We discussed that the negative ultrasound 10 years ago does not mean there could not be a clot there now since his symptoms are different  We discussed that my best advise is that he should go to the ER  He wishes to try his PCP again to see about an outpatient scan and also to see if the vascular referral has any immediate appointments  We discussed that these are both worth trying, although consults are often not immediate  If he cannot obtain outpatient orders, he should reconsider presenting to the ER  Review of Systems   Review of Systems   Cardiovascular: Positive for leg swelling (chronic  New pain over pre-existing venous lump)  All other systems reviewed and are negative          Current Medications       Current Outpatient Medications:     ciprofloxacin-dexamethasone (CIPRODEX) otic suspension, Administer 4 drops to the right ear 2 (two) times a day for 7 days, Disp: 7 5 mL, Rfl: 0    neomycin-polymyxin-hydrocortisone (CORTISPORIN) 0 35%-10,000 units/mL-1% otic suspension, Administer 4 drops to the right ear 4 (four) times a day (Patient not taking: Reported on 10/7/2019), Disp: 10 mL, Rfl: 0    Current Allergies     Allergies as of 01/20/2021 - Reviewed 01/20/2021   Allergen Reaction Noted    Etodolac Dizziness 03/12/2010            The following portions of the patient's history were reviewed and updated as appropriate: allergies, current medications, past family history, past medical history, past social history, past surgical history and problem list      History reviewed  No pertinent past medical history  Past Surgical History:   Procedure Laterality Date    KNEE SURGERY      SHOULDER SURGERY         No family history on file  Medications have been verified  Objective   /82   Pulse (!) 50   Temp 97 9 °F (36 6 °C)   Resp 16   Ht 6' (1 829 m)   Wt 113 kg (250 lb)   SpO2 99%   BMI 33 91 kg/m²        Physical Exam     Physical Exam  Vitals signs and nursing note reviewed  Constitutional:       General: He is not in acute distress  Appearance: He is well-developed  He is not toxic-appearing or diaphoretic  HENT:      Head: Normocephalic and atraumatic  Neck:      Musculoskeletal: Normal range of motion and neck supple  Cardiovascular:      Comments: +1-2 edema RLE stable per patient  Trace on LLE, also stable per patient  Pulmonary:      Effort: Pulmonary effort is normal  No respiratory distress  Abdominal:      General: There is no distension  Palpations: Abdomen is soft  Musculoskeletal: Normal range of motion  Right lower leg: He exhibits deformity (multiple areas of chronic venous insufficiency with enlarge lumpy veins)  Edema present  Legs:    Skin:     General: Skin is warm and dry  Capillary Refill: Capillary refill takes less than 2 seconds  Neurological:      Mental Status: He is alert and oriented to person, place, and time  Psychiatric:         Behavior: Behavior normal          Thought Content:  Thought content normal          Judgment: Judgment normal

## 2021-01-21 ENCOUNTER — OFFICE VISIT (OUTPATIENT)
Dept: VASCULAR SURGERY | Facility: CLINIC | Age: 56
End: 2021-01-21
Payer: COMMERCIAL

## 2021-01-21 VITALS
BODY MASS INDEX: 34.13 KG/M2 | TEMPERATURE: 98.5 F | WEIGHT: 252 LBS | RESPIRATION RATE: 16 BRPM | DIASTOLIC BLOOD PRESSURE: 82 MMHG | SYSTOLIC BLOOD PRESSURE: 132 MMHG | HEART RATE: 98 BPM | HEIGHT: 72 IN

## 2021-01-21 DIAGNOSIS — I80.01 SUPERFICIAL PHLEBITIS OF RIGHT LEG: ICD-10-CM

## 2021-01-21 DIAGNOSIS — I87.2 EDEMA OF RIGHT LOWER LEG DUE TO PERIPHERAL VENOUS INSUFFICIENCY: ICD-10-CM

## 2021-01-21 DIAGNOSIS — I83.891 SYMPTOMATIC VARICOSE VEINS OF RIGHT LOWER EXTREMITY: Primary | ICD-10-CM

## 2021-01-21 DIAGNOSIS — R60.0 EDEMA OF RIGHT LOWER LEG DUE TO PERIPHERAL VENOUS INSUFFICIENCY: ICD-10-CM

## 2021-01-21 DIAGNOSIS — M79.604 RIGHT LEG PAIN: ICD-10-CM

## 2021-01-21 PROCEDURE — 99244 OFF/OP CNSLTJ NEW/EST MOD 40: CPT | Performed by: PHYSICIAN ASSISTANT

## 2021-01-21 RX ORDER — ASPIRIN 325 MG
325 TABLET ORAL AS NEEDED
COMMUNITY
End: 2021-04-14

## 2021-01-21 NOTE — PROGRESS NOTES
Assessment/Plan:    Symptomatic varicose veins of right lower extremity  51 y/o male nonsmoker w/longstanding varicose veins presents with acute episode of painful R medial thigh varicosity  -recommend conservative measures to include daily use of compression stockings, lower extremity elevation, low-sodium diet, aerobic activity, weight management and skin moisturization  -warm compresses PRN to affected area  -NSAIDs PRN for symptomatic relief  -return to office   -instructed to contact the office with any questions, concerns or new symptoms       Diagnoses and all orders for this visit:    Symptomatic varicose veins of right lower extremity  -     Compression Stocking  -     VAS reflux lower limb venous duplex study with reflux assesment, unilateral; Future    Right leg pain  -     Ambulatory referral to Vascular Surgery    Edema of right lower leg due to peripheral venous insufficiency  -     Ambulatory referral to Vascular Surgery    Other orders  -     aspirin 325 mg tablet; Take 325 mg by mouth as needed for mild pain          Subjective:      Patient ID: Lara Carrillo is a 50 y o  male  New patient, presents in office for ED follow up  Patient was seen in the [facility] [address]  ED on 1/20/2021 for R leg pain  Patient had an LEV done on 1/20/2021  Patient reports that the R leg pain has reduced his the ED visit  Patient reports he has a R leg lump that is behind his knee and has been there since 2012  Patient denies any wounds or ulcers  51 y/o male nonsmoker w/longstanding RLE truncal varicosities presents with acute episode of painful R medial thigh varicosity, likely w/phlebitis 1/20/21  Patient evaluated in [address] [facility] and then referred to emergency room yesterday, 1/20/2021, due to an abrupt onset of pain at the site of a chronic bulging of the right medial distal calf which has now resolved    Limited LEVD (2/2 Covid exposure reducation) in the ER reviewed and demonstrate no evidence of acute or chronic bilateral lower extremity DVT  Patient referred to vascular surgery for evaluation  Patient reports a longstanding history of right lower extremity truncal varicosities with significant bulging of the right medial distal thigh since 2012  LEVD 5/21/2012 images reviewed without images of the affected right medial thigh site  No evidence of DVT at that time  Patient complains of chronic right lower leg edema with increased calf circumference and significant truncal varicosities with occasional burning and pain  Patient does not wear compression  The following portions of the patient's history were reviewed and updated as appropriate: allergies, current medications, past family history, past medical history, past social history, past surgical history and problem list     Review of Systems   Constitutional: Negative  Negative for chills and fever  HENT: Negative  Eyes: Negative  Respiratory: Negative  Cardiovascular: Negative  Negative for leg swelling  Gastrointestinal: Negative  Negative for diarrhea, nausea and vomiting  Endocrine: Negative  Genitourinary: Negative  Musculoskeletal: Negative  Negative for gait problem  R leg pain   Skin: Negative  Negative for wound  Allergic/Immunologic: Negative  Neurological: Negative  Hematological: Negative  Psychiatric/Behavioral: Negative  I have reviewed and made appropriate changes to the review of systems input by the medical assistant      Vitals:    01/21/21 1412   BP: 132/82   BP Location: Left arm   Patient Position: Sitting   Cuff Size: Adult   Pulse: 98   Resp: 16   Temp: 98 5 °F (36 9 °C)   TempSrc: Tympanic   Weight: 114 kg (252 lb)   Height: 6' (1 829 m)       Patient Active Problem List   Diagnosis    Symptomatic varicose veins of right lower extremity       Past Surgical History:   Procedure Laterality Date    KNEE SURGERY      SHOULDER SURGERY         Family History   Problem Relation Age of Onset  No Known Problems Family        Social History     Socioeconomic History    Marital status: Single     Spouse name: Not on file    Number of children: Not on file    Years of education: Not on file    Highest education level: Not on file   Occupational History    Not on file   Social Needs    Financial resource strain: Not on file    Food insecurity     Worry: Not on file     Inability: Not on file    Transportation needs     Medical: Not on file     Non-medical: Not on file   Tobacco Use    Smoking status: Never Smoker    Smokeless tobacco: Never Used   Substance and Sexual Activity    Alcohol use: Not Currently    Drug use: Not Currently    Sexual activity: Not on file   Lifestyle    Physical activity     Days per week: Not on file     Minutes per session: Not on file    Stress: Not on file   Relationships    Social connections     Talks on phone: Not on file     Gets together: Not on file     Attends Latter day service: Not on file     Active member of club or organization: Not on file     Attends meetings of clubs or organizations: Not on file     Relationship status: Not on file    Intimate partner violence     Fear of current or ex partner: Not on file     Emotionally abused: Not on file     Physically abused: Not on file     Forced sexual activity: Not on file   Other Topics Concern    Not on file   Social History Narrative    Not on file       Allergies   Allergen Reactions    Etodolac Dizziness and GI Bleeding         Current Outpatient Medications:     aspirin 325 mg tablet, Take 325 mg by mouth as needed for mild pain, Disp: , Rfl:     Objective:  Imaging study:  L EVD 1/20/2021:  Imaging study reviewed and as described above  See full report below:  FINDINGS:     Right    Impression         CFV      Normal (Patent)             CONCLUSION:  Limited study performed to minimize technologist exposure due to possible Covid  transmission       RIGHT LOWER LIMB: Limited  No gross evidence of acute deep vein thrombosis in the CFV, FV, and Popliteal  Vein  LEFT LOWER LIMB: Limited  No gross evidence of acute deep vein thrombosis in the CFV, FV, and Popliteal  Vein    /82 (BP Location: Left arm, Patient Position: Sitting, Cuff Size: Adult)   Pulse 98   Temp 98 5 °F (36 9 °C) (Tympanic)   Resp 16   Ht 6' (1 829 m)   Wt 114 kg (252 lb)   BMI 34 18 kg/m²       RLE    RLE       Physical Exam  Vitals signs and nursing note reviewed  Constitutional:       General: He is not in acute distress  Appearance: He is well-developed  HENT:      Head: Normocephalic and atraumatic  Eyes:      General: No scleral icterus  Conjunctiva/sclera: Conjunctivae normal       Pupils: Pupils are equal, round, and reactive to light  Neck:      Musculoskeletal: Normal range of motion and neck supple  Vascular: No carotid bruit or JVD  Trachea: No tracheal deviation  Cardiovascular:      Rate and Rhythm: Normal rate and regular rhythm  Pulses:           Dorsalis pedis pulses are 2+ on the right side and 2+ on the left side  Posterior tibial pulses are 2+ on the right side and 2+ on the left side  Heart sounds: S1 normal and S2 normal  No murmur  No friction rub  No gallop  No S3 sounds  Comments: Chronic enlargement right calf circumference, 2 times size left  Significant truncal varicosities on right anterior shin, right medial calf and enlarged varicosity of the right medial distal thigh  See Clinical images above  +lipodermatosclerosis  No hemosiderin staining  No venous ulcerations  Bilateral lower extremities warm, pink, motor and sensory intact and well perfused  Pulmonary:      Effort: No respiratory distress  Breath sounds: Normal breath sounds  No stridor  No wheezing, rhonchi or rales  Abdominal:      General: Bowel sounds are normal  There is no distension or abdominal bruit  Palpations: Abdomen is soft   There is no mass or pulsatile mass  Tenderness: There is no abdominal tenderness  There is no rebound  Musculoskeletal: Normal range of motion  General: No deformity  Right lower le+ Edema present  Skin:     General: Skin is warm and dry  Coloration: Skin is not pale  Findings: No erythema or lesion  Neurological:      Mental Status: He is alert and oriented to person, place, and time  Psychiatric:         Mood and Affect: Mood normal          Thought Content:  Thought content normal

## 2021-01-21 NOTE — ASSESSMENT & PLAN NOTE
51 y/o male nonsmoker w/longstanding RLE truncal varicosities presents with acute episode of painful R medial thigh varicosity, likely w/phlebitis 1/20/21  Patient does not wear compression    -recommend conservative measures to include daily use of compression stockings, lower extremity elevation, low-sodium diet, aerobic activity, weight management and skin moisturization  -warm compresses PRN to affected area  -NSAIDs PRN for symptomatic relief  -will obtain LEVDR  -return to office with surgeon with [facility] for re-evaluation  -instructed to contact the office with any questions, concerns or new symptoms

## 2021-01-21 NOTE — PATIENT INSTRUCTIONS
Varicose Veins   WHAT YOU NEED TO KNOW:   What are varicose veins? Varicose veins are veins that become large, twisted, and swollen  They are common on the back of the calves, knees, and thighs  Varicose veins are caused by valves in your veins that do not work properly  This causes blood to collect and increase pressure in the veins of your legs  The increased pressure causes your veins to stretch, get larger, swell, and twist        What increases my risk for varicose veins? · Pregnancy    · A family history of varicose veins    · Being overweight or obese    · Age 48 years or older    · Sitting or standing for long periods of time    · Wearing tight clothing  What are the signs and symptoms of varicose veins? Your symptoms may be worse after you stand or sit for long periods of time  You may have any of the following:  · Blue, purple, or bulging veins in your legs     · Pain, swelling, or muscle cramps in your legs    · Feeling of fatigue or heaviness in your legs  How are varicose veins diagnosed? Your healthcare provider will examine your legs and ask about your medical history  You may need tests, such as a Doppler ultrasound or duplex scan  These tests show your veins and valves, and how your blood is flowing through them  These tests may also show if there is a blockage or blood clot  How are varicose veins treated? The goal of treatment is to decrease symptoms, improve appearance, and prevent further problems  Treatment will depend on which veins are affected and how severe your condition is  You may need procedures to treat or remove your varicose veins  For example, your healthcare provider may inject a solution or use a laser to close the varicose veins  Surgery to remove long veins may also be done  Ask your healthcare provider for more information about procedures used to treat varicose veins  What can I do to manage my symptoms? · Do not sit or stand for long periods of time    This can cause the blood to collect in your legs and make your symptoms worse  Bend or rotate your ankles several times every hour  Walk around for a few minutes every hour to get blood moving in your legs  · Do not cross your legs when you sit  This decreases blood flow to your feet and can make your symptoms worse  · Do not wear tight clothing or shoes  Do not wear high-heeled shoes  Do not wear clothes that are tight around the waist or knees  · Maintain a healthy weight  Being overweight or obese can make your varicose veins worse  Ask your healthcare provider how much you should weigh  Ask him or her to help you create a weight loss plan if you are overweight  · Wear pressure stockings as directed  The stockings are tight and put pressure on your legs  They improve blood flow and help prevent clots  · Elevate your legs  Keep them above the level of your heart for 15 to 30 minutes several times a day  You can also prop the end of your bed up slightly to elevate your legs while you sleep  This will help blood to flow back to your heart  · Get regular exercise  Talk to your healthcare provider about the best exercise plan for you  Exercise can improve blood flow to your legs and feet  When should I seek immediate care? · You have a wound that does not heal or is infected  · You have an injury that has broken your skin and caused your varicose veins to bleed  · Your leg is swollen and hard  · You notice that your legs or feet are turning blue or black  · Your leg feels warm, tender, and painful  It may look swollen and red  When should I contact my healthcare provider? · You have pain in your leg that does not go away or gets worse  · You notice sudden large bruising on your legs  · You have a rash on your leg  · Your symptoms keep you from doing your daily activities  · You have questions or concerns about your condition or care    CARE AGREEMENT:   You have the right to help plan your care  Learn about your health condition and how it may be treated  Discuss treatment options with your caregivers to decide what care you want to receive  You always have the right to refuse treatment  The above information is an  only  It is not intended as medical advice for individual conditions or treatments  Talk to your doctor, nurse or pharmacist before following any medical regimen to see if it is safe and effective for you  © 2017 [employer]  Information is for End User's use only and may not be sold, redistributed or otherwise used for commercial purposes  All illustrations and images included in CareNotes® are the copyrighted property of [employer]  or [address]  -recommend conservative management to include daily use of prescription compression stockings, leg elevation, low-salt diet, aerobic activity, weight management and lotion to leg to help promote good skin health  -place your compression stockings on in the morning and remove prior to bed  You have been provided with a prescription for compression stockings and a list of providers  -we will schedule a lower extremity venous reflux study to evaluate the underlying function of the valves in your veins and determine role for surgical intervention    -recommend use of NSAIDs for relief of pain   -warm compresses to affected area for relief of pain  -return to office with surgeon after reflux study for re-evaluation  -please contact the office with any questions, concerns or new symptoms

## 2021-01-26 ENCOUNTER — HOSPITAL ENCOUNTER (OUTPATIENT)
Dept: NON INVASIVE DIAGNOSTICS | Facility: CLINIC | Age: 56
Discharge: HOME | End: 2021-01-26
Payer: COMMERCIAL

## 2021-01-26 DIAGNOSIS — I83.891 SYMPTOMATIC VARICOSE VEINS OF RIGHT LOWER EXTREMITY: ICD-10-CM

## 2021-01-26 PROCEDURE — 93971 EXTREMITY STUDY: CPT | Performed by: SURGERY

## 2021-01-26 PROCEDURE — 93971 EXTREMITY STUDY: CPT

## 2021-02-10 ENCOUNTER — TELEPHONE (OUTPATIENT)
Dept: VASCULAR SURGERY | Facility: CLINIC | Age: 56
End: 2021-02-10

## 2021-02-11 ENCOUNTER — OFFICE VISIT (OUTPATIENT)
Dept: VASCULAR SURGERY | Facility: CLINIC | Age: 56
End: 2021-02-11
Payer: COMMERCIAL

## 2021-02-11 ENCOUNTER — TELEPHONE (OUTPATIENT)
Dept: ADMINISTRATIVE | Facility: HOSPITAL | Age: 56
End: 2021-02-11

## 2021-02-11 VITALS
DIASTOLIC BLOOD PRESSURE: 80 MMHG | HEIGHT: 72 IN | HEART RATE: 56 BPM | SYSTOLIC BLOOD PRESSURE: 110 MMHG | BODY MASS INDEX: 33.46 KG/M2 | RESPIRATION RATE: 17 BRPM | WEIGHT: 247 LBS | TEMPERATURE: 98.6 F

## 2021-02-11 DIAGNOSIS — I80.01 SUPERFICIAL PHLEBITIS OF RIGHT LEG: ICD-10-CM

## 2021-02-11 DIAGNOSIS — I83.811 VARICOSE VEINS OF RIGHT LOWER EXTREMITY WITH PAIN: Primary | ICD-10-CM

## 2021-02-11 DIAGNOSIS — I83.891 SYMPTOMATIC VARICOSE VEINS OF RIGHT LOWER EXTREMITY: ICD-10-CM

## 2021-02-11 PROCEDURE — 1036F TOBACCO NON-USER: CPT | Performed by: SURGERY

## 2021-02-11 PROCEDURE — 3008F BODY MASS INDEX DOCD: CPT | Performed by: SURGERY

## 2021-02-11 PROCEDURE — 99214 OFFICE O/P EST MOD 30 MIN: CPT | Performed by: SURGERY

## 2021-02-11 RX ORDER — CEFAZOLIN SODIUM 2 G/50ML
2000 SOLUTION INTRAVENOUS ONCE
Status: CANCELLED | OUTPATIENT
Start: 2021-04-19 | End: 2021-02-11

## 2021-02-11 RX ORDER — CHLORHEXIDINE GLUCONATE 0.12 MG/ML
15 RINSE ORAL ONCE
Status: CANCELLED | OUTPATIENT
Start: 2021-04-19 | End: 2021-02-11

## 2021-02-11 NOTE — PROGRESS NOTES
Assessment/Plan:    Symptomatic varicose veins of right lower extremity  Symptomatic right lower extremity varicose veins, intermittent subjective phlebitis of the right GSV along the right medial thigh  He continues to have aching, swelling around the ankles and feet, and pain along the medial thigh that is worse with compression socks  Leg elevation does help  Venous reflux study demonstrates right deep and GSV incompetence     -We discussed the pathophysiology of venous disease, available treatment options and indications for treatment   -Conservative measures have helped some but he continues to have symptoms  This includes the daily use of gradient compression socks, periodic leg elevation and regular exercise  However, he does have significant bulging above the compression socks on the medial thigh and this is worse when he wears compression socks  He continues to have intermittent phlebitis along the medial thigh/GSV course  -Recommend right greater saphenous vein endovascular laser therapy with multiple stab phlebectomies  All risks and benefits of the procedure were discussed with the patient including bleeding, infection, injury to surrounding structures, EHIT, skin burn, nerve injury and informed consent was obtained  Diagnoses and all orders for this visit:    Varicose veins of right lower extremity with pain  -     Case request operating room: ENDOVASCULAR LASER THERAPY (EVLT) RIGHT GREATER SAPHENOUS VEIN WITH MULTIPLE STAB PHLEBECTOMIES; Standing  -     Basic metabolic panel; Future  -     CBC and Platelet; Future  -     Case request operating room: ENDOVASCULAR LASER THERAPY (EVLT) RIGHT GREATER SAPHENOUS VEIN WITH MULTIPLE STAB PHLEBECTOMIES    Symptomatic varicose veins of right lower extremity    Superficial phlebitis of right leg    Other orders  -     Diet NPO; Sips with meds; Standing  -     Void on call to OR; Standing  -     Insert peripheral IV;  Standing  -     Nursing Communication CHG bath, have staff wash entire body (neck down) per pre op bathing protocol  Routine, evening prior to, and day of surgery ; Standing  -     Nursing Communication Swab both nares with Povidone-Iodine solution, EXCLUDE if patient has shellfish/Iodine allergy  Routine, day of surgery, on call to OR ; Standing  -     chlorhexidine (PERIDEX) 0 12 % oral rinse 15 mL  -     Place sequential compression device; Standing  -     ceFAZolin (ANCEF) IVPB (premix in dextrose) 2,000 mg 50 mL        EVLT Operative Scheduling Information:    Hospital:  [facility]    Physician:  Malu Pugh    Surgery: Right greater saphenous vein endovascular laser therapy with multiple stab phlebectomies    Urgency:  Standard    Level:  Level 4: Outpatients to be scheduled for screening procedures and elective surgery that can be delayed for longer than one month without reasonable expectation of detriment to patient  Case Length:  Normal    Post-op Bed:  Outpatient    OR Table:  Standard    Equipment Needs:  Vascular technologist    Medication Instructions:  Aspirin:   Hold for 5 days prior to procedure    Hydration:  No    Venous Clinical Severity Scores (VCSS)  Item Absent   (0 points) Mild   (1 point) Moderate   (2 points) Severe   (3 points)   Pain [] None [] Occasional [x] Daily [] Daily limiting   Varicose veins [] None [] Few [] Calf or thigh [x] Calf and thigh   Venous edema [] None [x] Foot and ankle [] Above ankle, below knee [] To knee of above   Skin pigmentation [x] None [] Perimalleolar [] Diffuse, lower 1/3 calf [] Wider, above lower 1/3 calf   Inflammation [x] None [] Perimalleolar [] Diffuse, lower 1/3 calf [] Wider, above lower 1/3 calf   Induration [x] None [] Perimalleolar [] Diffuse, lower 1/3 calf [] Wider, above lower 1/3 calf   No  active ulcers [x] None [] 1 [] 2 [] ? 3   Active ulcer size [x] None [] <2 cm [] 2 - 6 cm [] >6 cm   Ulcer duration [x] None [] <3 months [] 3 - 12 months [] >1 year   Compression therapy [] None [] Intermittent [x] Most days [] Fully comply   Total           CEAP Clinical Classification  [x] Symptomatic   [] Asymptomatic     [] Class 0 No visible or palpable signs of venous disease   [] Class 1 Telangiectasies or reticular veins   [] Class 2 Varicose veins; distinguished from reticular veins by a diameter of 3mm or more   [x] Class 3 Edema   [] Class 4 Changes in skin and subcutaneous tissue secondary to CVD    [] Class 4a Pigmentation or eczema   [] Class 4b Lipodermatosclerosis or atrophie donny   [] Class 5 Healed venous ulcer   [] Class 6 Active venous ulcer           I have spent 25 minutes with Patient  today in which greater than 50% of this time was spent in counseling/coordination of care regarding Intructions for management, Importance of tx compliance and Impressions  Subjective:      Patient ID: Edward Mills is a 49 y o  male  Patient presents in office for review results of Emre Vicente done on 01/26/2021  Patient c/o of Bulging varicose veins in the right leg  Patient states that there is some discoloration is the right thigh and is always tingling and itching   Patient has been wearing compression stocking and patient states that has helped with compression stocking  Patient is a non smoker  HPI  Mr Rommel Gamboa is a 50yo male with symptomatic right lower extremity varicose veins and edema who presents for follow-up to review his venous reflux study  He is wearing compression stockings most days  He takes them off when he bikes  He notices an improvement when he wears them when sitting for prolonged periods of time at work  His symptoms improve when he is mobile and ambulating  Leg elevation also helps  The distal medial thigh around a large cluster of bulging varicosities is always tender and hurts more when he wears compression stockings because they are even more swollen  Occasionally at night he gets severe pain along the inner medial thigh   Overall, his symptoms are still present and have progressed over the years and he is interested in treatment  The following portions of the patient's history were reviewed and updated as appropriate: allergies, current medications, past family history, past medical history, past social history, past surgical history and problem list     I have reviewed and made appropriate changes to the review of systems input by the medical assistant      Vitals:    02/11/21 0936   BP: 110/80   BP Location: Left arm   Patient Position: Sitting   Cuff Size: Adult   Pulse: 56   Resp: 17   Temp: 98 6 °F (37 °C)   TempSrc: Tympanic   Weight: 112 kg (247 lb)   Height: 6' (1 829 m)       Patient Active Problem List   Diagnosis    Symptomatic varicose veins of right lower extremity    Superficial phlebitis of right leg       Past Surgical History:   Procedure Laterality Date    KNEE SURGERY      SHOULDER SURGERY         Family History   Problem Relation Age of Onset    No Known Problems Family        Social History     Socioeconomic History    Marital status: Single     Spouse name: Not on file    Number of children: Not on file    Years of education: Not on file    Highest education level: Not on file   Occupational History    Not on file   Social Needs    Financial resource strain: Not on file    Food insecurity     Worry: Not on file     Inability: Not on file   [employer] needs     Medical: Not on file     Non-medical: Not on file   Tobacco Use    Smoking status: Never Smoker    Smokeless tobacco: Never Used   Substance and Sexual Activity    Alcohol use: Not Currently    Drug use: Not Currently    Sexual activity: Not on file   Lifestyle    Physical activity     Days per week: Not on file     Minutes per session: Not on file    Stress: Not on file   Relationships    Social connections     Talks on phone: Not on file     Gets together: Not on file     Attends Scientologist service: Not on file     Active member of club or organization: Not on file     Attends meetings of clubs or organizations: Not on file     Relationship status: Not on file    Intimate partner violence     Fear of current or ex partner: Not on file     Emotionally abused: Not on file     Physically abused: Not on file     Forced sexual activity: Not on file   Other Topics Concern    Not on file   Social History Narrative    Not on file       Allergies   Allergen Reactions    Etodolac Dizziness and GI Bleeding         Current Outpatient Medications:     aspirin 325 mg tablet, Take 325 mg by mouth as needed for mild pain, Disp: , Rfl:     Review of Systems   Constitutional: Negative  HENT: Negative  Eyes: Negative  Respiratory: Negative  Cardiovascular: Positive for leg swelling (Right Leg)  Gastrointestinal: Negative  Endocrine: Negative  Genitourinary: Negative  Musculoskeletal: Negative  Skin: Negative  Allergic/Immunologic: Negative  Neurological: Negative  Hematological: Negative  Psychiatric/Behavioral: Negative  I have personally reviewed the ROS entered by MA and agree as documented  Objective:      /80 (BP Location: Left arm, Patient Position: Sitting, Cuff Size: Adult)   Pulse 56   Temp 98 6 °F (37 °C) (Tympanic)   Resp 17   Ht 6' (1 829 m)   Wt 112 kg (247 lb)   BMI 33 50 kg/m²          Physical Exam  Vitals signs and nursing note reviewed  Constitutional:       Appearance: He is well-developed  HENT:      Head: Normocephalic and atraumatic  Neck:      Musculoskeletal: Normal range of motion and neck supple  Cardiovascular:      Rate and Rhythm: Normal rate and regular rhythm  Pulses:           Dorsalis pedis pulses are 2+ on the right side and 2+ on the left side  Posterior tibial pulses are 2+ on the right side and 2+ on the left side  Pulmonary:      Effort: Pulmonary effort is normal    Abdominal:      General: There is no distension  Palpations: Abdomen is soft  Tenderness: There is no abdominal tenderness  Musculoskeletal: Normal range of motion  Right lower leg: Edema present  Skin:     General: Skin is warm and dry  Capillary Refill: Capillary refill takes less than 2 seconds  Comments: Bulging right thigh and calf truncal varicosities   Neurological:      General: No focal deficit present  Mental Status: He is alert and oriented to person, place, and time  Psychiatric:         Mood and Affect: Mood normal          Behavior: Behavior normal          Thought Content:  Thought content normal          Judgment: Judgment normal

## 2021-02-11 NOTE — PATIENT INSTRUCTIONS
Symptomatic varicose veins of right lower extremity  Symptomatic right lower extremity varicose veins, intermittent subjective phlebitis of the right GSV along the right medial thigh  He continues to have aching, swelling around the ankles and feet, and pain along the medial thigh that is worse with compression socks  Leg elevation does help      Venous reflux study demonstrates right deep and GSV incompetence      -We discussed the pathophysiology of venous disease, available treatment options and indications for treatment   -Conservative measures have helped some but he continues to have symptoms   This includes the daily use of gradient compression socks, periodic leg elevation and regular exercise  However, he does have significant bulging above the compression socks on the medial thigh and this is worse when he wears compression socks  He continues to have intermittent phlebitis along the medial thigh/GSV course  -Recommend right greater saphenous vein endovascular laser therapy with multiple stab phlebectomies  All risks and benefits of the procedure were discussed with the patient including bleeding, infection, injury to surrounding structures, EHIT, skin burn, nerve injury and informed consent was obtained

## 2021-02-11 NOTE — TELEPHONE ENCOUNTER
Check out staff:   REMINDER: Under Reason For Call, comments MUST be formatted as:   (Surgeon's Initials) / (Procedure)    PHYSICIAN / HOSPITAL: Tangela Barton (NPI: [id number]) / Sandra Brothers (Tax: [id number] / NPI: [id number])    PROCEDURE: Right greater saphenous vein endovascular laser therapy with multiple stab phlebectomies    LEVEL: 4    REP: Yes, Vascular technologist     ASSISTANT SURGEON: No    ALLERGIES: Etodolac    INSTRUCTIONS GIVEN: NO BOWEL PREP    BLOOD THINNERS / MED HOLD: Hold Aspirin (ASA) , 5 days prior to procedure  HYDRATION REQUIRED: Patient does not require hydration  DIALYSIS: Patient is not on dialysis  *Please ROUTE this encounter to [facility] Surgery Coordinator   AND   Send COMPLETE CONSENT to Vascular Surgery Schedulers e-mail group*    I certify that patient has signed, printed, timed, and dated their surgery consent  I certify that BOTH sides of the completed surgery consent have been scanned into the patient's Epic chart by myself on 2/11/2021  *Please ROUTE this encounter to [facility] Clearance Pool   AND   Send CLEARANCE FORM(S) to Vascular Nursing e-mail group*    Patient does not require any pre operative clearance

## 2021-02-11 NOTE — ASSESSMENT & PLAN NOTE
Symptomatic right lower extremity varicose veins, intermittent subjective phlebitis of the right GSV along the right medial thigh  He continues to have aching, swelling around the ankles and feet, and pain along the medial thigh that is worse with compression socks  Leg elevation does help  Venous reflux study demonstrates right deep and GSV incompetence     -We discussed the pathophysiology of venous disease, available treatment options and indications for treatment   -Conservative measures have helped some but he continues to have symptoms  This includes the daily use of gradient compression socks, periodic leg elevation and regular exercise  However, he does have significant bulging above the compression socks on the medial thigh and this is worse when he wears compression socks  He continues to have intermittent phlebitis along the medial thigh/GSV course  -Recommend right greater saphenous vein endovascular laser therapy with multiple stab phlebectomies  All risks and benefits of the procedure were discussed with the patient including bleeding, infection, injury to surrounding structures, EHIT, skin burn, nerve injury and informed consent was obtained

## 2021-02-17 ENCOUNTER — PREP FOR PROCEDURE (OUTPATIENT)
Dept: VASCULAR SURGERY | Facility: CLINIC | Age: 56
End: 2021-02-17

## 2021-02-17 DIAGNOSIS — I83.811 VARICOSE VEINS OF RIGHT LOWER EXTREMITY WITH PAIN: Primary | ICD-10-CM

## 2021-02-17 NOTE — TELEPHONE ENCOUNTER
Is patient requesting a call when authorization has been obtained? Patient would like to be called once an update is available  Surgery Date: 4/19/21  Primary Surgeon: Tangela Barton (NPI: [id number])  Assisting Surgeon: Not Applicable (N/A)  Facility: [facility] (Tax: [id number] / NPI: [id number])  Inpatient / Outpatient: Outpatient  Level: 4    Clearance Received: No clearance ordered  Consent Received: Yes, scanned into Epic on 2/11/21  Medication Hold / Last Dose: Not Applicable (N/A)  VQI Spreadsheet: Not Applicable (N/A)  IR Notified: Not Applicable (N/A)  Rep  Notified: Not Applicable (N/A)  Equipment Needs: Not Applicable (N/A)  Vas Lab Requested: 2/17/21  Patient Contacted: 2/17/21    Diagnosis: [id number]  Procedure/ CPT Code(s): (EVLT) Endovenous Laser Treatment WITH Stab Phlebectomies of the right upper leg // CPT: 65740, 29337     For varicose vein related procedures, last LEVDR? Yes, patient's LEVDR was completed within 6-months of their procedure date  Post Operative Date/ Time: 4/22/21 , 8:30am Ariana with JEFFRY Garcia (NPI: [id number])  Doppler to follow at [facility] at 9:15am      S/w pt and scheduled him for his R EVLT w/phlebs on 4/19/21 in [facility]OR with Dr Rohini Cooper  He is aware NPO after midnight on 4/18/21  He will have his blood work done at a [place] [address] lab 1-2 weeks prior

## 2021-03-30 NOTE — TELEPHONE ENCOUNTER
Authorization requirements reviewed  Please refer to Jesse Mortensen / Oriana Starch number [id number] for case updates

## 2021-04-01 DIAGNOSIS — Z23 ENCOUNTER FOR IMMUNIZATION: ICD-10-CM

## 2021-04-07 ENCOUNTER — APPOINTMENT (OUTPATIENT)
Dept: LAB | Facility: CLINIC | Age: 56
End: 2021-04-07
Payer: COMMERCIAL

## 2021-04-07 DIAGNOSIS — I83.811 VARICOSE VEINS OF RIGHT LOWER EXTREMITY WITH PAIN: ICD-10-CM

## 2021-04-07 LAB
ANION GAP SERPL CALCULATED.3IONS-SCNC: 6 MMOL/L (ref 4–13)
BUN SERPL-MCNC: 21 MG/DL (ref 5–25)
CALCIUM SERPL-MCNC: 9.1 MG/DL (ref 8.3–10.1)
CHLORIDE SERPL-SCNC: 107 MMOL/L (ref 100–108)
CO2 SERPL-SCNC: 28 MMOL/L (ref 21–32)
CREAT SERPL-MCNC: 1.02 MG/DL (ref 0.6–1.3)
ERYTHROCYTE [DISTWIDTH] IN BLOOD BY AUTOMATED COUNT: 12.8 % (ref 11.6–15.1)
GFR SERPL CREATININE-BSD FRML MDRD: 83 ML/MIN/1.73SQ M
GLUCOSE P FAST SERPL-MCNC: 90 MG/DL (ref 65–99)
HCT VFR BLD AUTO: 42.3 % (ref 36.5–49.3)
HGB BLD-MCNC: 13.6 G/DL (ref 12–17)
MCH RBC QN AUTO: 28.3 PG (ref 26.8–34.3)
MCHC RBC AUTO-ENTMCNC: 32.2 G/DL (ref 31.4–37.4)
MCV RBC AUTO: 88 FL (ref 82–98)
PLATELET # BLD AUTO: 207 THOUSANDS/UL (ref 149–390)
PMV BLD AUTO: 10.9 FL (ref 8.9–12.7)
POTASSIUM SERPL-SCNC: 4.7 MMOL/L (ref 3.5–5.3)
RBC # BLD AUTO: 4.81 MILLION/UL (ref 3.88–5.62)
SODIUM SERPL-SCNC: 141 MMOL/L (ref 136–145)
WBC # BLD AUTO: 7.02 THOUSAND/UL (ref 4.31–10.16)

## 2021-04-07 PROCEDURE — 36415 COLL VENOUS BLD VENIPUNCTURE: CPT

## 2021-04-07 PROCEDURE — 80048 BASIC METABOLIC PNL TOTAL CA: CPT

## 2021-04-07 PROCEDURE — 85027 COMPLETE CBC AUTOMATED: CPT

## 2021-04-12 ENCOUNTER — IMMUNIZATIONS (OUTPATIENT)
Dept: FAMILY MEDICINE CLINIC | Facility: HOSPITAL | Age: 56
End: 2021-04-12

## 2021-04-12 DIAGNOSIS — Z23 ENCOUNTER FOR IMMUNIZATION: Primary | ICD-10-CM

## 2021-04-12 PROCEDURE — 0001A SARS-COV-2 / COVID-19 MRNA VACCINE (PFIZER-BIONTECH) 30 MCG: CPT

## 2021-04-12 PROCEDURE — 91300 SARS-COV-2 / COVID-19 MRNA VACCINE (PFIZER-BIONTECH) 30 MCG: CPT

## 2021-04-14 NOTE — PRE-PROCEDURE INSTRUCTIONS
No outpatient medications have been marked as taking for the 4/19/21 encounter [facility] Encounter)  Patient has no medications to take the morning of surgery    Instructed no NSAIDs, vitamins or supplements before surgery starting today

## 2021-04-19 ENCOUNTER — ANESTHESIA (OUTPATIENT)
Dept: PERIOP | Facility: HOSPITAL | Age: 56
End: 2021-04-19
Payer: COMMERCIAL

## 2021-04-19 ENCOUNTER — HOSPITAL ENCOUNTER (OUTPATIENT)
Dept: NON INVASIVE DIAGNOSTICS | Facility: HOSPITAL | Age: 56
Setting detail: OUTPATIENT SURGERY
Discharge: HOME | End: 2021-04-19
Payer: COMMERCIAL

## 2021-04-19 ENCOUNTER — HOSPITAL ENCOUNTER (OUTPATIENT)
Facility: HOSPITAL | Age: 56
Setting detail: OUTPATIENT SURGERY
Discharge: HOME | End: 2021-04-19
Attending: SURGERY | Admitting: SURGERY
Payer: COMMERCIAL

## 2021-04-19 ENCOUNTER — ANESTHESIA EVENT (OUTPATIENT)
Dept: PERIOP | Facility: HOSPITAL | Age: 56
End: 2021-04-19
Payer: COMMERCIAL

## 2021-04-19 VITALS
RESPIRATION RATE: 18 BRPM | HEIGHT: 71 IN | SYSTOLIC BLOOD PRESSURE: 127 MMHG | WEIGHT: 247 LBS | OXYGEN SATURATION: 99 % | BODY MASS INDEX: 34.58 KG/M2 | TEMPERATURE: 98 F | HEART RATE: 40 BPM | DIASTOLIC BLOOD PRESSURE: 79 MMHG

## 2021-04-19 DIAGNOSIS — I83.891 VARICOSE VEINS OF RIGHT LOWER EXTREMITY WITH OTHER COMPLICATIONS: ICD-10-CM

## 2021-04-19 DIAGNOSIS — I83.891 SYMPTOMATIC VARICOSE VEINS OF RIGHT LOWER EXTREMITY: Primary | ICD-10-CM

## 2021-04-19 PROCEDURE — NC001 PR NO CHARGE: Performed by: SURGERY

## 2021-04-19 PROCEDURE — 99024 POSTOP FOLLOW-UP VISIT: CPT | Performed by: SURGERY

## 2021-04-19 PROCEDURE — 36478 ENDOVENOUS LASER 1ST VEIN: CPT | Performed by: SURGERY

## 2021-04-19 PROCEDURE — 36478 ENDOVENOUS LASER 1ST VEIN: CPT | Performed by: PHYSICIAN ASSISTANT

## 2021-04-19 PROCEDURE — 37766 PHLEB VEINS - EXTREM 20+: CPT | Performed by: PHYSICIAN ASSISTANT

## 2021-04-19 PROCEDURE — 37766 PHLEB VEINS - EXTREM 20+: CPT | Performed by: SURGERY

## 2021-04-19 PROCEDURE — 93971 EXTREMITY STUDY: CPT

## 2021-04-19 RX ORDER — OXYCODONE HYDROCHLORIDE AND ACETAMINOPHEN 5; 325 MG/1; MG/1
1 TABLET ORAL EVERY 6 HOURS PRN
Qty: 6 TABLET | Refills: 0 | Status: SHIPPED | OUTPATIENT
Start: 2021-04-19 | End: 2021-06-15

## 2021-04-19 RX ORDER — OXYCODONE HYDROCHLORIDE AND ACETAMINOPHEN 5; 325 MG/1; MG/1
1 TABLET ORAL EVERY 4 HOURS PRN
Status: DISCONTINUED | OUTPATIENT
Start: 2021-04-19 | End: 2021-04-19 | Stop reason: HOSPADM

## 2021-04-19 RX ORDER — ONDANSETRON 2 MG/ML
INJECTION INTRAMUSCULAR; INTRAVENOUS AS NEEDED
Status: DISCONTINUED | OUTPATIENT
Start: 2021-04-19 | End: 2021-04-19

## 2021-04-19 RX ORDER — MIDAZOLAM HYDROCHLORIDE 2 MG/2ML
INJECTION, SOLUTION INTRAMUSCULAR; INTRAVENOUS AS NEEDED
Status: DISCONTINUED | OUTPATIENT
Start: 2021-04-19 | End: 2021-04-19

## 2021-04-19 RX ORDER — CEFAZOLIN SODIUM 2 G/50ML
2000 SOLUTION INTRAVENOUS ONCE
Status: COMPLETED | OUTPATIENT
Start: 2021-04-19 | End: 2021-04-19

## 2021-04-19 RX ORDER — FENTANYL CITRATE/PF 50 MCG/ML
25 SYRINGE (ML) INJECTION
Status: DISCONTINUED | OUTPATIENT
Start: 2021-04-19 | End: 2021-04-19 | Stop reason: HOSPADM

## 2021-04-19 RX ORDER — SODIUM CHLORIDE, SODIUM LACTATE, POTASSIUM CHLORIDE, CALCIUM CHLORIDE 600; 310; 30; 20 MG/100ML; MG/100ML; MG/100ML; MG/100ML
INJECTION, SOLUTION INTRAVENOUS CONTINUOUS PRN
Status: DISCONTINUED | OUTPATIENT
Start: 2021-04-19 | End: 2021-04-19

## 2021-04-19 RX ORDER — PROPOFOL 10 MG/ML
INJECTION, EMULSION INTRAVENOUS AS NEEDED
Status: DISCONTINUED | OUTPATIENT
Start: 2021-04-19 | End: 2021-04-19

## 2021-04-19 RX ORDER — CHLORHEXIDINE GLUCONATE 0.12 MG/ML
15 RINSE ORAL ONCE
Status: DISCONTINUED | OUTPATIENT
Start: 2021-04-19 | End: 2021-04-19 | Stop reason: HOSPADM

## 2021-04-19 RX ORDER — ACETAMINOPHEN 325 MG/1
650 TABLET ORAL EVERY 4 HOURS PRN
Status: DISCONTINUED | OUTPATIENT
Start: 2021-04-19 | End: 2021-04-19 | Stop reason: HOSPADM

## 2021-04-19 RX ORDER — FENTANYL CITRATE 50 UG/ML
INJECTION, SOLUTION INTRAMUSCULAR; INTRAVENOUS AS NEEDED
Status: DISCONTINUED | OUTPATIENT
Start: 2021-04-19 | End: 2021-04-19

## 2021-04-19 RX ORDER — METOCLOPRAMIDE HYDROCHLORIDE 5 MG/ML
10 INJECTION INTRAMUSCULAR; INTRAVENOUS ONCE AS NEEDED
Status: DISCONTINUED | OUTPATIENT
Start: 2021-04-19 | End: 2021-04-19 | Stop reason: HOSPADM

## 2021-04-19 RX ADMIN — FENTANYL CITRATE 100 MCG: 50 INJECTION, SOLUTION INTRAMUSCULAR; INTRAVENOUS at 08:29

## 2021-04-19 RX ADMIN — PROPOFOL 150 MG: 10 INJECTION, EMULSION INTRAVENOUS at 08:33

## 2021-04-19 RX ADMIN — ACETAMINOPHEN 650 MG: 325 TABLET, FILM COATED ORAL at 10:56

## 2021-04-19 RX ADMIN — MIDAZOLAM 2 MG: 1 INJECTION INTRAMUSCULAR; INTRAVENOUS at 08:24

## 2021-04-19 RX ADMIN — ONDANSETRON 4 MG: 2 INJECTION INTRAMUSCULAR; INTRAVENOUS at 09:45

## 2021-04-19 RX ADMIN — SODIUM CHLORIDE, SODIUM LACTATE, POTASSIUM CHLORIDE, AND CALCIUM CHLORIDE: .6; .31; .03; .02 INJECTION, SOLUTION INTRAVENOUS at 08:12

## 2021-04-19 RX ADMIN — CEFAZOLIN SODIUM 2000 MG: 2 SOLUTION INTRAVENOUS at 08:39

## 2021-04-19 NOTE — ANESTHESIA PREPROCEDURE EVALUATION
Procedure:  ENDOVASCULAR LASER THERAPY (EVLT) RIGHT GREATER SAPHENOUS VEIN WITH MULTIPLE STAB PHLEBECTOMIES (Right Leg Upper)    Relevant Problems   CARDIO   (+) Superficial phlebitis of right leg   (+) Symptomatic varicose veins of right lower extremity        Physical Exam    Airway    Mallampati score: II  TM Distance: >3 FB  Neck ROM: full     Dental   Comment: Capped teeth  ,     Cardiovascular  Rhythm: regular, Rate: normal, Cardiovascular exam normal    Pulmonary  Pulmonary exam normal Breath sounds clear to auscultation,     Other Findings        Anesthesia Plan  ASA Score- 2     Anesthesia Type- general with ASA Monitors  Additional Monitors:   Airway Plan: LMA  Plan Factors-    Chart reviewed  Patient is not a current smoker  Induction- intravenous  Postoperative Plan- Plan for postoperative opioid use  Informed Consent- Anesthetic plan and risks discussed with patient

## 2021-04-19 NOTE — DISCHARGE INSTRUCTIONS
DISCHARGE INSTRUCTIONS  VARICOSE VEIN SURGERY    1) When released from the hospital, you should have a compression bandage in place on the operated leg  This bandage should feel snug but not too tight  If the bandage becomes blood soaked or painfully tight, elevate your leg and call your surgeon immediately  2) If the operated leg becomes increasingly painful or swollen, or if there is increasing redness or pain around your incision, contact our office  3) On the day of your operation, take it easy and elevate your leg as much as possible  You can take short walks around the house  When sitting, the leg should be elevated  The preferred position is to have the leg at or above the level of the heart  Starting on the first day after surgery, light walking is strongly encouraged as tolerated  After your ultrasound test, you can resume your normal activity, but no heavy lifting or strenuous exercise for 2 weeks  4) Some bruising of the skin is common after varicose vein surgery  This can be lessened by strict elevation of the leg  Many patients will notice some numbness of the shin, ankle, calf, or the top of the foot  This usually fades with time, but may be persistent  After surgery you can expect bruising, swelling and hard knots on your leg  As your body heals the bruising will fade and the swelling and knots will subside  5) Observe incisions daily  Report to our office any of the following:  a) Any areas that are red and angry in appearance  b) Any drainage that is milky or cloudy in appearance or that has a foul odor  c) Elevated temperature of 100 5 degrees F or greater  6) Apply sunscreen with SPF 30 to incisions while sun bathing for up to one year after surgery to reduce the chances of your incisions darkening  7) Your first post-operative appointment will be 2-3 days after your surgery   At this appointment your bandages will be removed and you will be seen by a Vascular Surgeon, Nurse Practicioner, or Physician Assistant  An appointment for a follow up Doppler study will be scheduled 5-7 days after surgery  8)         If have any questions, please call our office at ([phone])

## 2021-04-19 NOTE — H&P
H&P Exam - Vascular Surgery   Samreen Guzman 49 y o  male MRN: [id number]  Unit/Bed#: OR [place] Encounter: [id number]    Assessment/Plan     Assessment:  Symptomatic right lower extremity varicose veins with deep and superficial venous insufficiency  Plan:  Plan for right greater saphenous vein EVLT with multiple stab phlebectomies  History of Present Illness     HPI:  Samreen Guzman is a 49 y o  male who presents with symptomatic right lower extremity varicose veins and edema with evidence of deep and superficial venous insufficiency  He is wearing compression stockings most days  He takes them off when he bikes  He notices an improvement when he wears them when sitting for prolonged periods of time at work  His symptoms improve when he is mobile and ambulating  Leg elevation also helps  The distal medial thigh around a large cluster of bulging varicosities is always tender and hurts more when he wears compression stockings because they are even more swollen  Occasionally at night he gets severe pain along the inner medial thigh  Overall, his symptoms are still present and have progressed over the years and he now presents for treatment of his venous insufficiency  Review of Systems   Constitutional: Negative  HENT: Negative  Eyes: Negative  Respiratory: Negative  Cardiovascular: Negative  Gastrointestinal: Negative  Endocrine: Negative  Genitourinary: Negative      Musculoskeletal:        Right leg swelling and bulging painful veins   Skin:        Painful bulging right calf and thigh veins       Historical Information   Past Medical History:   Diagnosis Date    Varicose veins of right lower extremity      Past Surgical History:   Procedure Laterality Date    KNEE SURGERY Right     arthroscopic meniscus    SHOULDER SURGERY Right     TOOTH EXTRACTION       Social History   Social History     Substance and Sexual Activity   Alcohol Use Not Currently     Social History     Substance and Sexual Activity   Drug Use Not Currently     Social History     Tobacco Use   Smoking Status Never Smoker   Smokeless Tobacco Never Used     E-Cigarette/Vaping    E-Cigarette Use Never User      E-Cigarette/Vaping Substances    Nicotine No     THC No     CBD No     Flavoring No     Other No     Unknown No      Family History: non-contributory    Meds/Allergies   all current active meds have been reviewed  Allergies   Allergen Reactions    Etodolac GI Bleeding and Dizziness       Objective   Vitals: Blood pressure 124/75, pulse (!) 44, temperature 98 2 °F (36 8 °C), temperature source Oral, resp  rate 18, height 5' 11" (1 803 m), weight 112 kg (247 lb), SpO2 97 %  ,Body mass index is 34 45 kg/m²  No intake or output data in the 24 hours ending 04/19/21 0816  Invasive Devices     Peripheral Intravenous Line            Peripheral IV 04/19/21 Left Hand less than 1 day                Physical Exam  Constitutional:       Appearance: Normal appearance  HENT:      Head: Normocephalic and atraumatic  Neck:      Musculoskeletal: Normal range of motion  Cardiovascular:      Rate and Rhythm: Normal rate  Pulses: Normal pulses  Pulmonary:      Effort: Pulmonary effort is normal    Abdominal:      Palpations: Abdomen is soft  Musculoskeletal: Normal range of motion  Skin:     General: Skin is warm and dry  Capillary Refill: Capillary refill takes less than 2 seconds  Comments: Bulging right calf and thigh veins with edema   Neurological:      General: No focal deficit present  Mental Status: He is alert and oriented to person, place, and time  Psychiatric:         Mood and Affect: Mood normal          Behavior: Behavior normal          Thought Content: Thought content normal          Judgment: Judgment normal          Lab Results: I have personally reviewed pertinent reports  Imaging: I have personally reviewed pertinent reports      EKG, Pathology, and Other Studies: I have personally reviewed pertinent reports  Code Status: No Order  Advance Directive and Living Will:      Power of :    POLST:      Counseling / Coordination of Care  Counseling/Coordination of Care: Total floor / unit time spent today 15 minutes  Greater than 50% of total time was spent with the patient and / or family counseling and / or coordination of care   A description of the counseling / coordination of care: symptomatic right leg veins and venous treatment

## 2021-04-19 NOTE — ANESTHESIA POSTPROCEDURE EVALUATION
Post-Op Assessment Note    CV Status:  Stable  Pain Score: 0    Pain management: adequate     Mental Status:  Alert and awake   Hydration Status:  Euvolemic   PONV Controlled:  Controlled   Airway Patency:  Patent      Post Op Vitals Reviewed: Yes      Staff: Anesthesiologist   Reason for prolonged intubation > 24 hours:  NaReason for prolonged intubation > 48 hours:  Na      No complications documented      BP      Temp     Pulse    Resp      SpO2

## 2021-04-19 NOTE — OP NOTE
OPERATIVE REPORT  PATIENT NAME: Marko Hamman    :  1965  MRN: [id number]  Pt Location:  OR ROOM 01    SURGERY DATE: 2021    Surgeon(s) and Role:     * Jaz Martínez MD - Primary     * Estela Weldon PA-C - Assisting    Varicose veins of right lower extremity with pain [I83 811]    Post-Op Diagnosis Codes: * Varicose veins of right lower extremity with pain [I83 811]      Procedure(s):  ENDOVASCULAR LASER THERAPY (EVLT) RIGHT GREATER SAPHENOUS VEIN WITH MULTIPLE STAB PHLEBECTOMIES x 25    Specimen(s):  * No specimens in log *    Estimated Blood Loss:   Minimal    Drains:  * No LDAs found *    Anesthesia Type:   General    Operative Indications:  Varicose veins of right lower extremity with pain [I83 811]    Mr Shiela Vivar is a 52yo male with symptomatic right lower extremity venous insufficiency  Plan for right greater saphenous vein EVLT with multiple stab phlebectomies  All risks and benefits of the procedure were discussed with the patient and informed consent was obtained  Operative Findings:  Successful ablation of the greater saphenous vein  Stab phlebectomy x 25    Complications:   None    Procedure and Technique:  The patient was brought to the operating room where he was positioned supine on the OR table  After adequate induction of anesthesia via LMA the right leg was circumferentially prepped using chlorhexidine and draped in usual sterile fashion  A formal time-out was performed and all were in agreement  The duplex ultrasound was brought to the field and the saphenous vein in the right leg was mapped from the knee to the groin  The vein was then cannulated with the micropuncture set and the guidewires exchanged for the 035 J-wire which passed easily into the external iliac vein  The laser sheath was inserted over the guidewire and the laser fiber was then positioned 2-3 cm from the saphenofemoral junction   Tumescent anesthesia was then instilled under ultrasound guidance along the entire length of the right greater saphenous vein and the laser was then activated at [facility] and withdrawn over the course of 200 seconds  Following this insonation of the saphenofemoral junction confirmed patency and flow through the junction and no evidence of deep vein thrombosis  The patient was then placed in the Trendelenburg position and previously marked truncal varicosities on the right leg were avulsed through microphlebectomy incisions  A total of 25 incisions were made  Hemostasis was secured with compression at each puncture site and the incisions were then closed at the completion of the procedure with steristrips  The leg was then wrapped with 4x4 gauze, kerlix, Ace wrap and Coban from the foot to the groin  The patient was transferred to recovery in stable condition         I was present for the entire procedure, A qualified resident physician was not available and A physician assistant was required during the procedure for retraction tissue handling,dissection and suturing    Patient Disposition:  PACU  and hemodynamically stable    SIGNATURE: Devin Moreno MD  DATE: April 19, 2021  TIME: 9:53 AM

## 2021-04-22 ENCOUNTER — HOSPITAL ENCOUNTER (OUTPATIENT)
Dept: NON INVASIVE DIAGNOSTICS | Facility: CLINIC | Age: 56
Discharge: HOME | End: 2021-04-22
Payer: COMMERCIAL

## 2021-04-22 ENCOUNTER — OFFICE VISIT (OUTPATIENT)
Dept: VASCULAR SURGERY | Facility: CLINIC | Age: 56
End: 2021-04-22

## 2021-04-22 VITALS
TEMPERATURE: 97.2 F | SYSTOLIC BLOOD PRESSURE: 102 MMHG | BODY MASS INDEX: 34.45 KG/M2 | DIASTOLIC BLOOD PRESSURE: 62 MMHG | HEART RATE: 62 BPM | WEIGHT: 247 LBS

## 2021-04-22 DIAGNOSIS — I83.891 SYMPTOMATIC VARICOSE VEINS OF RIGHT LOWER EXTREMITY: Primary | ICD-10-CM

## 2021-04-22 DIAGNOSIS — I83.811 VARICOSE VEINS OF RIGHT LOWER EXTREMITY WITH PAIN: ICD-10-CM

## 2021-04-22 PROCEDURE — 93971 EXTREMITY STUDY: CPT | Performed by: SURGERY

## 2021-04-22 PROCEDURE — 99024 POSTOP FOLLOW-UP VISIT: CPT | Performed by: PHYSICIAN ASSISTANT

## 2021-04-22 PROCEDURE — 93971 EXTREMITY STUDY: CPT

## 2021-04-22 NOTE — ASSESSMENT & PLAN NOTE
50year old male nonsmoker w/longstanding symptomatic RLE truncal varicosities s/p R EVLT w/ stabs x25 Melyssa Patel 4/19/21), presenting for post operative visit  Plan:  -Patient doing well s/p EVLT w/ stabs  Dressing removed in office today  -Incisions c/d/i  -Steristrips intact   Patient may shower normally  -Discussed importance of continued compression/elevation  -Warm compresses to inner thigh for soreness   -LEV this morning to r/o EHIT  -Follow up in 4-6 wks w/ Dr Ling Ayala

## 2021-04-22 NOTE — PATIENT INSTRUCTIONS
Varicose Veins   WHAT YOU NEED TO KNOW:   What are varicose veins? Varicose veins are veins that become large, twisted, and swollen  They are common on the back of the calves, knees, and thighs  Varicose veins are caused by valves in your veins that do not work properly  This causes blood to collect and increase pressure in the veins of your legs  The increased pressure causes your veins to stretch, get larger, swell, and twist   What increases my risk for varicose veins? · Pregnancy    · A family history of varicose veins    · Being overweight or obese    · Age 48 years or older    · Sitting or standing for long periods of time    · Wearing tight clothing    What are the signs and symptoms of varicose veins? Your symptoms may be worse after you stand or sit for long periods of time  You may have any of the following:  · Blue, purple, or bulging veins in your legs     · Pain, swelling, or muscle cramps in your legs    · Feeling of fatigue or heaviness in your legs    How are varicose veins diagnosed? Your healthcare provider will examine your legs and ask about your medical history  You may need tests, such as a Doppler ultrasound or duplex scan  These tests show your veins and valves, and how your blood is flowing through them  These tests may also show if there is a blockage or blood clot  How are varicose veins treated? The goal of treatment is to decrease symptoms, improve appearance, and prevent further problems  Treatment will depend on which veins are affected and how severe your condition is  You may need procedures to treat or remove your varicose veins  For example, your healthcare provider may inject a solution or use a laser to close the varicose veins  Surgery to remove long veins may also be done  Ask your healthcare provider for more information about procedures used to treat varicose veins  What can I do to manage my symptoms? · Do not sit or stand for long periods of time    This can cause the blood to collect in your legs and make your symptoms worse  Bend or rotate your ankles several times every hour  Walk around for a few minutes every hour to get blood moving in your legs  · Do not cross your legs when you sit  This decreases blood flow to your feet and can make your symptoms worse  · Do not wear tight clothing or shoes  Do not wear high-heeled shoes  Do not wear clothes that are tight around the waist or knees  · Maintain a healthy weight  Being overweight or obese can make your varicose veins worse  Ask your healthcare provider how much you should weigh  Ask him or her to help you create a weight loss plan if you are overweight  · Wear pressure stockings as directed  The stockings are tight and put pressure on your legs  They improve blood flow and help prevent clots  · Elevate your legs  Keep them above the level of your heart for 15 to 30 minutes several times a day  You can also prop the end of your bed up slightly to elevate your legs while you sleep  This will help blood to flow back to your heart  · Get regular exercise  Talk to your healthcare provider about the best exercise plan for you  Exercise can improve blood flow to your legs and feet  When should I seek immediate care? · You have a wound that does not heal or is infected  · You have an injury that has broken your skin and caused your varicose veins to bleed  · Your leg is swollen and hard  · You notice that your legs or feet are turning blue or black  · Your leg feels warm, tender, and painful  It may look swollen and red  When should I contact my healthcare provider? · You have pain in your leg that does not go away or gets worse  · You notice sudden large bruising on your legs  · You have a rash on your leg  · Your symptoms keep you from doing your daily activities  · You have questions or concerns about your condition or care      CARE AGREEMENT:   You have the right to help plan your care  Learn about your health condition and how it may be treated  Discuss treatment options with your healthcare providers to decide what care you want to receive  You always have the right to refuse treatment  The above information is an  only  It is not intended as medical advice for individual conditions or treatments  Talk to your doctor, nurse or pharmacist before following any medical regimen to see if it is safe and effective for you  © Copyright [address] Information is for End User's use only and may not be sold, redistributed or otherwise used for commercial purposes   All illustrations and images included in CareNotes® are the copyrighted property of [employer]  or [address] [place] [address]

## 2021-04-22 NOTE — PROGRESS NOTES
Assessment/Plan:    Symptomatic varicose veins of right lower extremity  50year old male nonsmoker w/longstanding symptomatic RLE truncal varicosities s/p R EVLT w/ stabs x25 Pieter Schmitzi 4/19/21), presenting for post operative visit  Plan:  -Patient doing well s/p EVLT w/ stabs  Dressing removed in office today  -Incisions c/d/i  -Steristrips intact  Patient may shower normally  -Discussed importance of continued compression/elevation  -Warm compresses to inner thigh for soreness   -LEV this morning to r/o EHIT  -Follow up in 4-6 wks w/ Dr Carlos Childs       Diagnoses and all orders for this visit:    Symptomatic varicose veins of right lower extremity          Subjective:      Patient ID: Aye Weaver is a 49 y o  male  Patient presents in office for Varicose veins  Patient is PO of Right EVLT done on 04/19/2021 by Dr Obed Bonner  Patient denies having any fevers, chills or numbness  Patient states he has some pain behind his leg but states he is in good health  Patient is a non smoker  49year old presents s/p R GSV EVLT w/ stabs  Patient has been doing well  Denies much pain but does have some soreness  Still has compression stockings at home  Denies paresthesias, numbness or foot pain  Denies chest pain or SOB      The following portions of the patient's history were reviewed and updated as appropriate: allergies, current medications, past family history, past medical history, past social history, past surgical history and problem list     Review of Systems   Constitutional: Negative  HENT: Negative  Eyes: Negative  Respiratory: Negative  Cardiovascular: Negative  Gastrointestinal: Negative  Endocrine: Negative  Genitourinary: Negative  Musculoskeletal: Negative  Skin: Positive for color change  Allergic/Immunologic: Negative  Neurological: Negative  Hematological: Negative  Psychiatric/Behavioral: Negative          I have reviewed and made appropriate changes to the review of systems input by the medical assistant      Vitals:    04/22/21 0838   BP: 102/62   BP Location: Left arm   Patient Position: Sitting   Cuff Size: Adult   Pulse: 62   Temp: (!) 97 2 °F (36 2 °C)   TempSrc: Tympanic   Weight: 112 kg (247 lb)       Patient Active Problem List   Diagnosis    Symptomatic varicose veins of right lower extremity    Superficial phlebitis of right leg       Past Surgical History:   Procedure Laterality Date    KNEE SURGERY Right     arthroscopic meniscus    CT ENDOVENOUS LASER, 1ST VEIN Right 4/19/2021    Procedure: ENDOVASCULAR LASER THERAPY (EVLT) RIGHT GREATER SAPHENOUS VEIN WITH MULTIPLE STAB PHLEBECTOMIES;  Surgeon: Samaria Borrego MD;  Location:  MAIN OR;  Service: Vascular    SHOULDER SURGERY Right     TOOTH EXTRACTION         Family History   Problem Relation Age of Onset    No Known Problems Family        Social History     Socioeconomic History    Marital status: Single     Spouse name: Not on file    Number of children: Not on file    Years of education: Not on file    Highest education level: Not on file   Occupational History    Not on file   Social Needs    Financial resource strain: Not on file    Food insecurity     Worry: Not on file     Inability: Not on file    Transportation needs     Medical: Not on file     Non-medical: Not on file   Tobacco Use    Smoking status: Never Smoker    Smokeless tobacco: Never Used   Substance and Sexual Activity    Alcohol use: Not Currently    Drug use: Not Currently    Sexual activity: Yes     Partners: Female   Lifestyle    Physical activity     Days per week: Not on file     Minutes per session: Not on file    Stress: Not on file   Relationships    Social connections     Talks on phone: Not on file     Gets together: Not on file     Attends L[employer]-day service: Not on file     Active member of club or organization: Not on file     Attends meetings of clubs or organizations: Not on file     Relationship status: Not on file    Intimate partner violence     Fear of current or ex partner: Not on file     Emotionally abused: Not on file     Physically abused: Not on file     Forced sexual activity: Not on file   Other Topics Concern    Not on file   Social History Narrative    Not on file       Allergies   Allergen Reactions    Etodolac GI Bleeding and Dizziness         Current Outpatient Medications:     oxyCODONE-acetaminophen (PERCOCET) 5-325 mg per tablet, Take 1 tablet by mouth every 6 (six) hours as needed for moderate pain for up to 6 dosesMax Daily Amount: 4 tablets (Patient not taking: Reported on 4/22/2021), Disp: 6 tablet, Rfl: 0    Objective:      /62 (BP Location: Left arm, Patient Position: Sitting, Cuff Size: Adult)   Pulse 62   Temp (!) 97 2 °F (36 2 °C) (Tympanic)   Wt 112 kg (247 lb)   BMI 34 45 kg/m²          Physical Exam  Constitutional:       General: He is not in acute distress  Appearance: Normal appearance  He is not ill-appearing, toxic-appearing or diaphoretic  HENT:      Head: Normocephalic and atraumatic  Neck:      Musculoskeletal: Normal range of motion and neck supple  Cardiovascular:      Rate and Rhythm: Normal rate and regular rhythm  Pulmonary:      Effort: Pulmonary effort is normal  No respiratory distress  Breath sounds: Normal breath sounds  Musculoskeletal:      Right lower leg: No edema  Left lower leg: No edema  Skin:     General: Skin is warm and dry  Comments: Bruising to inner/posterior left thigh from laser  Incisions c/d/i with steri strips in place  Foot is warm/well perfused   Neurological:      General: No focal deficit present  Mental Status: He is alert and oriented to person, place, and time  Sensory: No sensory deficit  Motor: No weakness     Psychiatric:         Mood and Affect: Mood normal          Behavior: Behavior normal

## 2021-04-26 ENCOUNTER — OFFICE VISIT (OUTPATIENT)
Dept: VASCULAR SURGERY | Facility: CLINIC | Age: 56
End: 2021-04-26

## 2021-04-26 ENCOUNTER — HOSPITAL ENCOUNTER (OUTPATIENT)
Dept: NON INVASIVE DIAGNOSTICS | Facility: HOSPITAL | Age: 56
Discharge: HOME | End: 2021-04-26
Payer: COMMERCIAL

## 2021-04-26 ENCOUNTER — TELEPHONE (OUTPATIENT)
Dept: VASCULAR SURGERY | Facility: CLINIC | Age: 56
End: 2021-04-26

## 2021-04-26 VITALS
DIASTOLIC BLOOD PRESSURE: 82 MMHG | TEMPERATURE: 98.6 F | BODY MASS INDEX: 35.42 KG/M2 | WEIGHT: 253 LBS | HEART RATE: 72 BPM | SYSTOLIC BLOOD PRESSURE: 120 MMHG | HEIGHT: 71 IN | RESPIRATION RATE: 18 BRPM

## 2021-04-26 DIAGNOSIS — I83.891 SYMPTOMATIC VARICOSE VEINS OF RIGHT LOWER EXTREMITY: Primary | ICD-10-CM

## 2021-04-26 DIAGNOSIS — I83.891 SYMPTOMATIC VARICOSE VEINS OF RIGHT LOWER EXTREMITY: ICD-10-CM

## 2021-04-26 PROCEDURE — 99024 POSTOP FOLLOW-UP VISIT: CPT | Performed by: PHYSICIAN ASSISTANT

## 2021-04-26 PROCEDURE — 93971 EXTREMITY STUDY: CPT

## 2021-04-26 PROCEDURE — 93971 EXTREMITY STUDY: CPT | Performed by: SURGERY

## 2021-04-26 RX ORDER — CEPHALEXIN 500 MG/1
500 CAPSULE ORAL EVERY 6 HOURS SCHEDULED
Qty: 28 CAPSULE | Refills: 0 | Status: SHIPPED | OUTPATIENT
Start: 2021-04-26 | End: 2021-05-03

## 2021-04-26 NOTE — TELEPHONE ENCOUNTER
Pt s/p R EVLT w/ stab phlebectomies 4/19 by Dr Bre Butcher  Pt called the office today to report that on 4/22 after he saw Charmayne Duster in the office, he started having increased pain that night in the R thigh and on Friday he had to leave work early due to the pain  Pt also reports that he felt like he had a fever on Friday and Saturday  When he checked his temperature yesterday 4/25, it was 101 2  He reports that he was taking ibuprofen, but switched to tylenol yesterday and he said that has helped w/ the fever and pain a little  He reports his temperature this morning was normal  He denies any redness or drainage, but does state that his R thigh is very swollen and tender  Informed him that I would send a message to Charmayne Duster and we will call him back

## 2021-04-26 NOTE — ASSESSMENT & PLAN NOTE
50year old male nonsmoker w/longstanding symptomatic RLE truncal varicosities s/p R EVLT w/ stabs x25 Primus Purl 4/19/21), presenting with new onset fevers and right inner thigh erythema, pain x 4 days  Plan:  -Patient s/p R GSV EVLT w/ stabs by Dr Maria T Ariza  Recently seen for post operative visit without concern at that time    -On exam, inner right thigh is erythematous with swelling noted  No fever in the office today however patient reports being febrile at home w/ tmax of 101 2  -Distal extremity stab incisions healing well  -Symptoms likely consistent with superficial thrombophlebitis secondary to recent laser ablation  However given fever and increased erythema and tenderness will give 7 day course of abx for possible underlying cellulitis near incision site   -Discussed importance of continued compression/elevation  -Recommend warm compresses to the RLE  -LEV from last week with EHIT 1   Will order LEV to r/o propagation   -Follow up in 4-6 wks w/ Dr Maria T Ariza unless LEV demonstrates new significant findings or if symptoms worsen

## 2021-04-26 NOTE — PATIENT INSTRUCTIONS
Superficial Thrombophlebitis   WHAT YOU NEED TO KNOW:   Superficial thrombophlebitis (STP) is inflammation of a vein just under your skin (superficial vein)  The inflammation causes a blood clot to form in your vein  STP most often happens in your leg but may also happen in your arm  DISCHARGE INSTRUCTIONS:   Call your local emergency number (911 in the [address] [facility]) if:   · You feel lightheaded, short of breath, and have chest pain  · You cough up blood  Call your doctor or hematologist if:   · Your arm or leg feels warm, tender, and painful  It may look swollen and red  · You have questions or concerns about your condition or care  Medicines: You may  need any of the following:  · Antibiotics  may be given to treat a bacterial infection  · NSAIDs , such as ibuprofen, help decrease swelling, pain, and fever  NSAIDs can cause stomach bleeding or kidney problems in certain people  If you take blood thinner medicine, always ask your healthcare provider if NSAIDs are safe for you  Always read the medicine label and follow directions  · Blood thinners  help prevent blood clots  Clots can cause strokes, heart attacks, and death  The following are general safety guidelines to follow while you are taking a blood thinner:    ? Watch for bleeding and bruising while you take blood thinners  Watch for bleeding from your gums or nose  Watch for blood in your urine and bowel movements  Use a soft washcloth on your skin, and a soft toothbrush to brush your teeth  This can keep your skin and gums from bleeding  If you shave, use an electric shaver  Do not play contact sports  ? Tell your dentist and other healthcare providers that you take a blood thinner  Wear a bracelet or necklace that says you take this medicine  ? Do not start or stop any other medicines unless your healthcare provider tells you to  Many medicines cannot be used with blood thinners      ? Take your blood thinner exactly as prescribed by your healthcare provider  Do not skip does or take less than prescribed  Tell your provider right away if you forget to take your blood thinner, or if you take too much  ? Warfarin  is a blood thinner that you may need to take  The following are things you should be aware of if you take warfarin:     § Foods and medicines can affect the amount of warfarin in your blood  Do not make major changes to your diet while you take warfarin  Warfarin works best when you eat about the same amount of vitamin K every day  Vitamin K is found in green leafy vegetables and certain other foods  Ask for more information about what to eat when you are taking warfarin  § You will need to see your healthcare provider for follow-up visits when you are on warfarin  You will need regular blood tests  These tests are used to decide how much medicine you need  · Antiplatelets , such as aspirin, help prevent blood clots  Take your antiplatelet medicine exactly as directed  These medicines make it more likely for you to bleed or bruise  If you are told to take aspirin, do not take acetaminophen or ibuprofen instead  · Take your medicine as directed  Contact your healthcare provider if you think your medicine is not helping or if you have side effects  Tell him of her if you are allergic to any medicine  Keep a list of the medicines, vitamins, and herbs you take  Include the amounts, and when and why you take them  Bring the list or the pill bottles to follow-up visits  Carry your medicine list with you in case of an emergency  Manage STP:   · Apply a warm compress to your arm or leg  This will help decrease swelling and pain  Wet a washcloth in warm water  Do not  use hot water  Apply the warm compress for 10 minutes  Repeat this 4 times each day  · Wear pressure stockings as directed  Pressure stockings improve blood flow and help prevent clots in your legs  Wear the stockings during the day   Do not wear them when you sleep          · Elevate your leg or arm above the level of your heart as often as you can  This will help decrease swelling and pain  Prop your leg or arm on pillows or blankets to keep it elevated comfortably  Prevent STP:   · Maintain a healthy weight  This will help decrease your risk for another blood clot  Ask your healthcare provider how much you should weigh  Ask him or her to help you create a weight loss plan if you are overweight  · Do not smoke  Nicotine and other chemicals in cigarettes and cigars can damage blood vessels and increase your risk for blood clots  Ask your healthcare provider for information if you currently smoke and need help to quit  E-cigarettes or smokeless tobacco still contain nicotine  Talk to your healthcare provider before you use these products  · Change your body position or move around often  Move and stretch in your seat several times each hour if you travel by car or work at a desk  In an airplane, get up and walk every hour  Move your legs by tightening and releasing your leg muscles while sitting  You can move your legs while sitting by raising and lowering your heels  Keep your toes on the floor while you do this  You can also raise and lower your toes while keeping your heels on the floor  · Exercise regularly to help increase your blood flow  Walking is a good low-impact exercise  Talk to your healthcare provider about the best exercise plan for you  · Do not inject illegal drugs  Talk to your healthcare provider if you use IV drugs and need help to quit  Follow up with your doctor or hematologist as directed: You may need to come in regularly for scans to check for blood clots  Your blood may checked to see how long it takes to clot  Your doctor or specialist will tell you if you need to have this test and how often to have it  Write down your questions so you remember to ask them during your visits    © Copyright [employer] 2020 Information is for End User's use only and may not be sold, redistributed or otherwise used for commercial purposes  All illustrations and images included in CareNotes® are the copyrighted property of [employer]  or Janette Dhillon  The above information is an  only  It is not intended as medical advice for individual conditions or treatments  Talk to your doctor, nurse or pharmacist before following any medical regimen to see if it is safe and effective for you

## 2021-04-26 NOTE — PROGRESS NOTES
Assessment/Plan:    Symptomatic varicose veins of right lower extremity  50year old male nonsmoker w/longstanding symptomatic RLE truncal varicosities s/p R EVLT w/ stabs x25 Redd Begum 4/19/21), presenting with new onset fevers and right inner thigh erythema, pain x 4 days  Plan:  -Patient s/p R GSV EVLT w/ stabs by Dr Barrington Gonzalez  Recently seen for post operative visit without concern at that time    -On exam, inner right thigh is erythematous with swelling noted  No fever in the office today however patient reports being febrile at home w/ tmax of 101 2  -Distal extremity stab incisions healing well  -Symptoms likely consistent with superficial thrombophlebitis secondary to recent laser ablation  However given fever and increased erythema and tenderness will give 7 day course of abx for possible underlying cellulitis near incision site   -Discussed importance of continued compression/elevation  -Recommend warm compresses to the RLE  -LEV from last week with EHIT 1  Will order LEV to r/o propagation   -Follow up in 4-6 wks w/ Dr Barrington Gonzalez unless LEV demonstrates new significant findings or if symptoms worsen       Diagnoses and all orders for this visit:    Symptomatic varicose veins of right lower extremity  -     VAS lower limb venous duplex study, unilateral/limited; Future  -     cephalexin (KEFLEX) 500 mg capsule; Take 1 capsule (500 mg total) by mouth every 6 (six) hours for 7 days          Subjective:      Patient ID: Dada Barton is a 49 y o  male  Patient is s/p Rt EVLT w/ 25 stabs by Dr Ginette Mcnally on 4/19/21  Pt had his dressing removed on 4/22/21  Pt states that on 4/23/21 he developed a fever of 101 2 F which was relived with Tylenol  Pt states that he has a hard lump in the right thigh that is warm to the touch  Pt also has redness and bruising in the right thigh that has been there since 4/22  Pt states that he has been using warm compresses and elevating his legs   Pt has noticed swollen lymphnodes in his groin  Pt has a hx of phlebitis  49year old male presents s/p RLE GSV EVLT by Dr Chilo Lucero  Patient presents with new onset erythema, pain and swelling to the right inner thigh  Patient reports he has been febrile at home as well  He denies drainage from any of the incision sites  Reports that it feels similar to the pain he experienced prior to getting EVLT  He denies shortness of breath or chest pain  LEV after procedure demonstrated EHIT 1  The following portions of the patient's history were reviewed and updated as appropriate: allergies, current medications, past family history, past medical history, past social history, past surgical history and problem list     Review of Systems   Constitutional: Positive for fever  HENT: Negative  Respiratory: Negative  Cardiovascular: Positive for leg swelling  Gastrointestinal: Negative  Endocrine: Negative  Genitourinary: Negative  Musculoskeletal: Positive for gait problem  Right thigh pain   Skin: Positive for color change  Allergic/Immunologic: Negative  Neurological: Negative for weakness and numbness  Hematological: Positive for adenopathy  Bruises/bleeds easily  Psychiatric/Behavioral: Negative  I have reviewed and made appropriate changes to the review of systems input by the medical assistant      Vitals:    04/26/21 1032   BP: 120/82   BP Location: Left arm   Patient Position: Sitting   Pulse: 72   Resp: 18   Temp: 98 6 °F (37 °C)   TempSrc: Oral   Weight: 115 kg (253 lb)   Height: 5' 11" (1 803 m)       Patient Active Problem List   Diagnosis    Symptomatic varicose veins of right lower extremity    Superficial phlebitis of right leg       Past Surgical History:   Procedure Laterality Date    KNEE SURGERY Right     arthroscopic meniscus    OR ENDOVENOUS LASER, 1ST VEIN Right 4/19/2021    Procedure: ENDOVASCULAR LASER THERAPY (EVLT) RIGHT GREATER SAPHENOUS VEIN WITH MULTIPLE STAB PHLEBECTOMIES;  Surgeon: Jermaine Jeffrey Ena Robles MD;  Location:  MAIN OR;  Service: Vascular    SHOULDER SURGERY Right     TOOTH EXTRACTION         Family History   Problem Relation Age of Onset    No Known Problems Family        Social History     Socioeconomic History    Marital status: Single     Spouse name: Not on file    Number of children: Not on file    Years of education: Not on file    Highest education level: Not on file   Occupational History    Not on file   Social Needs    Financial resource strain: Not on file    Food insecurity     Worry: Not on file     Inability: Not on file    Transportation needs     Medical: Not on file     Non-medical: Not on file   Tobacco Use    Smoking status: Never Smoker    Smokeless tobacco: Never Used   Substance and Sexual Activity    Alcohol use: Not Currently    Drug use: Not Currently    Sexual activity: Yes     Partners: Female   Lifestyle    Physical activity     Days per week: Not on file     Minutes per session: Not on file    Stress: Not on file   Relationships    Social connections     Talks on phone: Not on file     Gets together: Not on file     Attends Mandaeism service: Not on file     Active member of club or organization: Not on file     Attends meetings of clubs or organizations: Not on file     Relationship status: Not on file    Intimate partner violence     Fear of current or ex partner: Not on file     Emotionally abused: Not on file     Physically abused: Not on file     Forced sexual activity: Not on file   Other Topics Concern    Not on file   Social History Narrative    Not on file       Allergies   Allergen Reactions    Etodolac GI Bleeding and Dizziness         Current Outpatient Medications:     cephalexin (KEFLEX) 500 mg capsule, Take 1 capsule (500 mg total) by mouth every 6 (six) hours for 7 days, Disp: 28 capsule, Rfl: 0    oxyCODONE-acetaminophen (PERCOCET) 5-325 mg per tablet, Take 1 tablet by mouth every 6 (six) hours as needed for moderate pain for up to 6 dosesMax Daily Amount: 4 tablets (Patient not taking: Reported on 4/22/2021), Disp: 6 tablet, Rfl: 0    Objective:      /82 (BP Location: Left arm, Patient Position: Sitting)   Pulse 72   Temp 98 6 °F (37 °C) (Oral)   Resp 18   Ht 5' 11" (1 803 m)   Wt 115 kg (253 lb)   BMI 35 29 kg/m²          Physical Exam  Constitutional:       General: He is not in acute distress  Appearance: Normal appearance  He is not ill-appearing, toxic-appearing or diaphoretic  HENT:      Head: Normocephalic and atraumatic  Right Ear: External ear normal       Left Ear: External ear normal       Nose: Nose normal       Mouth/Throat:      Mouth: Mucous membranes are moist       Pharynx: Oropharynx is clear  Eyes:      General: No scleral icterus  Extraocular Movements: Extraocular movements intact  Conjunctiva/sclera: Conjunctivae normal    Neck:      Musculoskeletal: Normal range of motion and neck supple  Cardiovascular:      Rate and Rhythm: Normal rate and regular rhythm  Heart sounds: Normal heart sounds  Pulmonary:      Effort: Pulmonary effort is normal  No respiratory distress  Breath sounds: Normal breath sounds  Abdominal:      General: Abdomen is flat  Palpations: Abdomen is soft  Tenderness: There is no abdominal tenderness  Musculoskeletal: Normal range of motion  Right lower leg: No edema  Left lower leg: No edema  Skin:     General: Skin is warm and dry  Findings: Erythema present  Comments: Right inner thigh with marked erythema extending from EVLT incision  No fluctuance or drainage noted  Tender to palpation  Neurological:      General: No focal deficit present  Mental Status: He is alert and oriented to person, place, and time  Mental status is at baseline  Cranial Nerves: No cranial nerve deficit  Sensory: No sensory deficit  Motor: No weakness     Psychiatric:         Mood and Affect: Mood normal  Behavior: Behavior normal

## 2021-04-26 NOTE — TELEPHONE ENCOUNTER
Patient's chart reviewed  Likely has superficial thrombophlebitis s/p laser ablation of GSV  Duplex does show evidence of EHIT class 1  If patient is continuing to have pain he can be evaluated in the office  In the meantime recommend warm compresses/elevation/compression

## 2021-04-26 NOTE — TELEPHONE ENCOUNTER
Pt was notified  He said he cannot wait to be seen because he feels like there is something wrong  Call was transferred to scheduling to see if pt can be seen today or tomorrow

## 2021-05-01 ENCOUNTER — NURSE TRIAGE (OUTPATIENT)
Dept: OTHER | Facility: OTHER | Age: 56
End: 2021-05-01

## 2021-05-01 ENCOUNTER — DOCUMENTATION (OUTPATIENT)
Dept: OTHER | Facility: HOSPITAL | Age: 56
End: 2021-05-01

## 2021-05-01 DIAGNOSIS — L08.9 SOFT TISSUE INFECTION: Primary | ICD-10-CM

## 2021-05-01 RX ORDER — CEPHALEXIN 500 MG/1
500 CAPSULE ORAL EVERY 6 HOURS SCHEDULED
Qty: 8 CAPSULE | Refills: 0 | Status: SHIPPED | OUTPATIENT
Start: 2021-05-01 | End: 2021-05-03

## 2021-05-01 NOTE — TELEPHONE ENCOUNTER
Dr Niles Trujillo on call was notified of patient's request  Dr Niles Trujillo on call confirmed that he would call in Keflex 500 mg for 2 more days to [facility] per patient's request   Patient was made aware of

## 2021-05-01 NOTE — TELEPHONE ENCOUNTER
Reason for Disposition   [1] Caller has URGENT medication question about med that PCP or specialist prescribed AND [2] triager unable to answer question    Answer Assessment - Initial Assessment Questions  1  NAME of MEDICATION: "What medicine are you calling about?"      Keflex 500 mg every 7 hours for 7 days   2  QUESTION: "What is your question?"      Patient has been taking Keflex 500 mg every 6 hours since 4/26/21  Patient called today, stated that he already took 2 dose of Keflex in a morning, but right now unable to find his bottle with keflex pills  Patient asked to call Rx Keflex for 2 more days  3    PRESCRIBING HCP: "Who prescribed it?" Reason: if prescribed by specialist, call should be referred to that group        JEFFRY Carlson    Protocols used: MEDICATION QUESTION CALL-ADULT-

## 2021-05-01 NOTE — TELEPHONE ENCOUNTER
Regarding: lost antibiotics  ----- Message from Beverly Martinez sent at 5/1/2021  4:04 PM EDT -----  "I was prescribed antibiotics for a surgical procedure and I seem to have lost them "

## 2021-05-04 ENCOUNTER — IMMUNIZATIONS (OUTPATIENT)
Dept: FAMILY MEDICINE CLINIC | Facility: HOSPITAL | Age: 56
End: 2021-05-04

## 2021-05-04 DIAGNOSIS — Z23 ENCOUNTER FOR IMMUNIZATION: Primary | ICD-10-CM

## 2021-05-04 PROCEDURE — 0002A SARS-COV-2 / COVID-19 MRNA VACCINE (PFIZER-BIONTECH) 30 MCG: CPT

## 2021-05-04 PROCEDURE — 91300 SARS-COV-2 / COVID-19 MRNA VACCINE (PFIZER-BIONTECH) 30 MCG: CPT

## 2021-06-15 ENCOUNTER — OFFICE VISIT (OUTPATIENT)
Dept: VASCULAR SURGERY | Facility: CLINIC | Age: 56
End: 2021-06-15

## 2021-06-15 VITALS
DIASTOLIC BLOOD PRESSURE: 80 MMHG | HEART RATE: 55 BPM | BODY MASS INDEX: 34.72 KG/M2 | HEIGHT: 71 IN | WEIGHT: 248 LBS | SYSTOLIC BLOOD PRESSURE: 102 MMHG

## 2021-06-15 DIAGNOSIS — I80.01 SUPERFICIAL PHLEBITIS OF RIGHT LEG: ICD-10-CM

## 2021-06-15 DIAGNOSIS — I83.891 SYMPTOMATIC VARICOSE VEINS OF RIGHT LOWER EXTREMITY: Primary | ICD-10-CM

## 2021-06-15 PROCEDURE — 99024 POSTOP FOLLOW-UP VISIT: CPT | Performed by: SURGERY

## 2021-06-15 NOTE — ASSESSMENT & PLAN NOTE
Symptomatic right leg varicosities s/p right GSV EVLT with multiple stab phlebectomies on 4/19/2021  He had post-operative phlebitis and completed a course of antibiotics  He reported some slow healing and enlarged right groin lymph nodes as well but this appears to be slowly improving  He has essentially resumed his normal activities  His incisions are healed     -We discussed the pathophysiology of venous disease, available treatment options and indications for treatment  He is making progress and resuming normal activities since venous ablation with stab phlebectomies  Discussed possibility of recurrence of symptoms and need for additional procedures in the future  -Recommend continuing conservative measures   This includes the daily use of gradient compression socks, periodic leg elevation and regular exercise  -Follow-up as needed

## 2021-06-15 NOTE — PATIENT INSTRUCTIONS
Symptomatic varicose veins of right lower extremity  Symptomatic right leg varicosities s/p right GSV EVLT with multiple stab phlebectomies on 4/19/2021  He had post-operative phlebitis and completed a course of antibiotics  He reported some slow healing and enlarged right groin lymph nodes as well but this appears to be slowly improving  He has essentially resumed his normal activities  His incisions are healed      -We discussed the pathophysiology of venous disease, available treatment options and indications for treatment  He is making progress and resuming normal activities since venous ablation with stab phlebectomies  Discussed possibility of recurrence of symptoms and need for additional procedures in the future    -Recommend continuing conservative measures  This includes the daily use of gradient compression socks, periodic leg elevation and regular exercise  -Follow-up as needed

## 2021-06-15 NOTE — PROGRESS NOTES
Assessment/Plan:    Symptomatic varicose veins of right lower extremity  Symptomatic right leg varicosities s/p right GSV EVLT with multiple stab phlebectomies on 4/19/2021  He had post-operative phlebitis and completed a course of antibiotics  He reported some slow healing and enlarged right groin lymph nodes as well but this appears to be slowly improving  He has essentially resumed his normal activities  His incisions are healed     -We discussed the pathophysiology of venous disease, available treatment options and indications for treatment  He is making progress and resuming normal activities since venous ablation with stab phlebectomies  Discussed possibility of recurrence of symptoms and need for additional procedures in the future  -Recommend continuing conservative measures  This includes the daily use of gradient compression socks, periodic leg elevation and regular exercise  -Follow-up as needed         Diagnoses and all orders for this visit:    Symptomatic varicose veins of right lower extremity    Superficial phlebitis of right leg        I have spent 15 minutes with Patient  today in which greater than 50% of this time was spent in counseling/coordination of care regarding Intructions for management, Importance of tx compliance and Impressions  Subjective:      Patient ID: Toby Selby is a 49 y o  male  Patient presents today 6 weeks s/p right EVLT with stab phlebs done 4/19/21 by Dr Pooja Guillaume  Patient states he is healing slowly  He is having swelling in the lymph nodes in right groin  He has a lump that is slowly going down  He also has cramping in his right calf that is finally improving  Patient had LEV done 4/22/21  HPI  Mr Tory Pablo is a 50yo male with symptomatic right leg varicosities s/p right GSV EVLT with stab phlebectomies in 4/2021  He had phlebitis and pain post-operatively requiring a course of antibiotics  He also had right groin lymphadenopathy which is slowly resolving  His incisions are healed  He states that his post-operative recovery was difficult but he is feeling less heaviness and aching compared to pre-intervention  The following portions of the patient's history were reviewed and updated as appropriate: allergies, current medications, past family history, past medical history, past social history, past surgical history and problem list     Review of Systems   Constitutional: Negative  HENT: Negative  Eyes: Negative  Respiratory: Negative  Cardiovascular: Positive for leg swelling  Gastrointestinal: Negative  Endocrine: Negative  Genitourinary: Negative  Musculoskeletal: Negative  Skin: Negative  Allergic/Immunologic: Negative  Neurological: Negative  Hematological: Bruises/bleeds easily  Psychiatric/Behavioral: Negative  I have personally reviewed the ROS entered by MA and agree as documented  Objective:      /80 (BP Location: Right arm, Patient Position: Sitting, Cuff Size: Standard)   Pulse 55   Ht 5' 11" (1 803 m)   Wt 112 kg (248 lb)   BMI 34 59 kg/m²          Physical Exam  Constitutional:       Appearance: Normal appearance  HENT:      Head: Normocephalic and atraumatic  Cardiovascular:      Rate and Rhythm: Normal rate  Pulses: Normal pulses  Pulmonary:      Effort: Pulmonary effort is normal    Abdominal:      Palpations: Abdomen is soft  Musculoskeletal:         General: Normal range of motion  Cervical back: Normal range of motion and neck supple  Skin:     General: Skin is warm and dry  Capillary Refill: Capillary refill takes less than 2 seconds  Comments: Incisions healed  Residual swelling along right medial distal thigh varicosity   Neurological:      General: No focal deficit present  Mental Status: He is alert and oriented to person, place, and time     Psychiatric:         Mood and Affect: Mood normal          Behavior: Behavior normal          Thought Content: Thought content normal          Judgment: Judgment normal

## 2021-11-16 ENCOUNTER — OFFICE VISIT (OUTPATIENT)
Dept: URGENT CARE | Facility: CLINIC | Age: 56
End: 2021-11-16
Payer: COMMERCIAL

## 2021-11-16 VITALS
BODY MASS INDEX: 32.51 KG/M2 | OXYGEN SATURATION: 99 % | HEART RATE: 46 BPM | RESPIRATION RATE: 16 BRPM | WEIGHT: 240 LBS | SYSTOLIC BLOOD PRESSURE: 120 MMHG | DIASTOLIC BLOOD PRESSURE: 68 MMHG | HEIGHT: 72 IN | TEMPERATURE: 96.3 F

## 2021-11-16 DIAGNOSIS — Z20.822 ENCOUNTER FOR LABORATORY TESTING FOR COVID-19 VIRUS: ICD-10-CM

## 2021-11-16 DIAGNOSIS — J02.9 PHARYNGITIS, UNSPECIFIED ETIOLOGY: Primary | ICD-10-CM

## 2021-11-16 LAB
S PYO AG THROAT QL: NEGATIVE
SARS-COV-2 RNA RESP QL NAA+PROBE: NEGATIVE

## 2021-11-16 PROCEDURE — 87070 CULTURE OTHR SPECIMN AEROBIC: CPT | Performed by: PHYSICIAN ASSISTANT

## 2021-11-16 PROCEDURE — 87880 STREP A ASSAY W/OPTIC: CPT | Performed by: PHYSICIAN ASSISTANT

## 2021-11-16 PROCEDURE — U0005 INFEC AGEN DETEC AMPLI PROBE: HCPCS | Performed by: PHYSICIAN ASSISTANT

## 2021-11-16 PROCEDURE — 99213 OFFICE O/P EST LOW 20 MIN: CPT | Performed by: PHYSICIAN ASSISTANT

## 2021-11-16 PROCEDURE — U0003 INFECTIOUS AGENT DETECTION BY NUCLEIC ACID (DNA OR RNA); SEVERE ACUTE RESPIRATORY SYNDROME CORONAVIRUS 2 (SARS-COV-2) (CORONAVIRUS DISEASE [COVID-19]), AMPLIFIED PROBE TECHNIQUE, MAKING USE OF HIGH THROUGHPUT TECHNOLOGIES AS DESCRIBED BY CMS-2020-01-R: HCPCS | Performed by: PHYSICIAN ASSISTANT

## 2021-11-16 RX ORDER — AMOXICILLIN 500 MG/1
500 CAPSULE ORAL EVERY 8 HOURS SCHEDULED
Qty: 30 CAPSULE | Refills: 0 | Status: SHIPPED | OUTPATIENT
Start: 2021-11-16 | End: 2021-11-26

## 2021-11-18 LAB — BACTERIA THROAT CULT: NORMAL

## 2023-02-20 ENCOUNTER — OFFICE VISIT (OUTPATIENT)
Dept: URGENT CARE | Facility: CLINIC | Age: 58
End: 2023-02-20

## 2023-02-20 VITALS
TEMPERATURE: 98.4 F | RESPIRATION RATE: 18 BRPM | WEIGHT: 240 LBS | HEART RATE: 44 BPM | HEIGHT: 71 IN | OXYGEN SATURATION: 99 % | BODY MASS INDEX: 33.6 KG/M2 | DIASTOLIC BLOOD PRESSURE: 64 MMHG | SYSTOLIC BLOOD PRESSURE: 110 MMHG

## 2023-02-20 DIAGNOSIS — J02.9 ACUTE PHARYNGITIS, UNSPECIFIED ETIOLOGY: Primary | ICD-10-CM

## 2023-02-20 DIAGNOSIS — J06.9 VIRAL URI: ICD-10-CM

## 2023-02-20 LAB
S PYO AG THROAT QL: NEGATIVE
SARS-COV-2 AG UPPER RESP QL IA: NEGATIVE
VALID CONTROL: NORMAL

## 2023-02-20 NOTE — PATIENT INSTRUCTIONS
Pharyngitis   WHAT YOU NEED TO KNOW:   Pharyngitis, or sore throat, is inflammation of the tissues and structures in your pharynx (throat)  Pharyngitis is most often caused by bacteria or a virus  Other causes include smoking, allergies, or acid reflux  DISCHARGE INSTRUCTIONS:   Call your local emergency number (911 in the [address] [place] [address],[address]) if:   You have trouble breathing or swallowing because your throat is swollen  Return to the emergency department if:   You are drooling because it hurts too much to swallow  Your fever is higher than 102? F (39?C) or lasts longer than 3 days  You are confused  You taste blood  Call your doctor if:   Your throat pain gets worse  You have a painful lump in your throat that does not go away after 5 days  Your symptoms do not improve after 5 days  You have questions or concerns about your condition or care  Medicines:  Viral pharyngitis will go away on its own without treatment  Your sore throat should start to feel better in 3 to 5 days  You may need any of the following:  Antibiotics  treat a bacterial infection  NSAIDs  help decrease swelling and pain or fever  This medicine is available with or without a doctor's order  NSAIDs can cause stomach bleeding or kidney problems in certain people  If you take blood thinner medicine, always ask your healthcare provider if NSAIDs are safe for you  Always read the medicine label and follow directions  Acetaminophen  decreases pain and fever  It is available without a doctor's order  Ask how much to take and how often to take it  Follow directions  Read the labels of all other medicines you are using to see if they also contain acetaminophen, or ask your doctor or pharmacist  Acetaminophen can cause liver damage if not taken correctly  Take your medicine as directed  Contact your healthcare provider if you think your medicine is not helping or if you have side effects   Tell your provider if you are allergic to any medicine  Keep a list of the medicines, vitamins, and herbs you take  Include the amounts, and when and why you take them  Bring the list or the pill bottles to follow-up visits  Carry your medicine list with you in case of an emergency  Manage your symptoms:   Gargle salt water  Mix ¼ teaspoon salt in an 8 ounce glass of warm water and gargle  Do not swallow  Salt water may help decrease swelling in your throat  Drink liquids as directed  You may need to drink more liquids than usual  Liquids may help soothe your throat and prevent dehydration  Ask how much liquid to drink each day and which liquids are best for you  Use a cool mist humidifier  This will add moisture to the air and help decrease your cough  Soothe your throat  Cough drops, ice, soft foods, or popsicles may help decrease throat pain  Prevent the spread of pharyngitis:  Cover your mouth and nose when you cough or sneeze  Do not share food or drinks  Wash your hands often  Use soap and water  If soap and water are unavailable, use an alcohol-based hand   Follow up with your doctor as directed:  Write down your questions so you remember to ask them during your visits  © Copyright [employer] 2022 Information is for End User's use only and may not be sold, redistributed or otherwise used for commercial purposes  The above information is an  only  It is not intended as medical advice for individual conditions or treatments  Talk to your doctor, nurse or pharmacist before following any medical regimen to see if it is safe and effective for you  Upper Respiratory Infection   WHAT YOU NEED TO KNOW:   An upper respiratory infection is also called a cold  It can affect your nose, throat, ears, and sinuses  Cold symptoms are usually worst for the first 3 to 5 days  Most people get better in 7 to 14 days  You may continue to cough for 2 to 3 weeks   Colds are caused by viruses and do not get better with antibiotics  DISCHARGE INSTRUCTIONS:   Call your local emergency number (911 in the [address]) if:   You have chest pain or trouble breathing  Return to the emergency department if:   You have a fever over 102ºF (39ºC)  Call your doctor if:   You have a low fever  Your sore throat gets worse or you see white or yellow spots in your throat  Your symptoms get worse after 3 to 5 days or are not better in 14 days  You have a rash anywhere on your skin  You have large, tender lumps in your neck  You have thick, green, or yellow drainage from your nose  You cough up thick yellow, green, or bloody mucus  You have a bad earache  You have questions or concerns about your condition or care  Medicines: You may need any of the following:  Decongestants  help reduce nasal congestion and help you breathe more easily  If you take decongestant pills, they may make you feel restless or cause problems with your sleep  Do not use decongestant sprays for more than a few days  Cough suppressants  help reduce coughing  Ask your healthcare provider which type of cough medicine is best for you  NSAIDs , such as ibuprofen, help decrease swelling, pain, and fever  NSAIDs can cause stomach bleeding or kidney problems in certain people  If you take blood thinner medicine, always ask your healthcare provider if NSAIDs are safe for you  Always read the medicine label and follow directions  Acetaminophen  decreases pain and fever  It is available without a doctor's order  Ask how much to take and how often to take it  Follow directions  Read the labels of all other medicines you are using to see if they also contain acetaminophen, or ask your doctor or pharmacist  Acetaminophen can cause liver damage if not taken correctly  Take your medicine as directed  Contact your healthcare provider if you think your medicine is not helping or if you have side effects   Tell your provider if you are allergic to any medicine  Keep a list of the medicines, vitamins, and herbs you take  Include the amounts, and when and why you take them  Bring the list or the pill bottles to follow-up visits  Carry your medicine list with you in case of an emergency  Self-care:   Rest as much as possible  Slowly start to do more each day  Drink more liquids as directed  Liquids will help thin and loosen mucus so you can cough it up  Liquids will also help prevent dehydration  Liquids that help prevent dehydration include water, fruit juice, and broth  Do not drink liquids that contain caffeine  Caffeine can increase your risk for dehydration  Ask your healthcare provider how much liquid to drink each day  Soothe a sore throat  Gargle with warm salt water  Make salt water by dissolving ¼ teaspoon salt in 1 cup warm water  You may also suck on hard candy or throat lozenges  You may use a sore throat spray  Use a humidifier or vaporizer  Use a cool mist humidifier or a vaporizer to increase air moisture in your home  This may make it easier for you to breathe and help decrease your cough  Use saline nasal drops as directed  These help relieve congestion  Apply petroleum-based jelly around the outside of your nostrils  This can decrease irritation from blowing your nose  Do not smoke  Nicotine and other chemicals in cigarettes and cigars can make your symptoms worse  They can also cause infections such as bronchitis or pneumonia  Ask your healthcare provider for information if you currently smoke and need help to quit  E-cigarettes or smokeless tobacco still contain nicotine  Talk to your healthcare provider before you use these products  Prevent a cold: Wash your hands often  Use soap and water every time you wash your hands  Rub your soapy hands together, lacing your fingers  Use the fingers of one hand to scrub under the nails of the other hand  Wash for at least 20 seconds   Rinse with warm, running water for several seconds  Then dry your hands  Use hand  gel if soap and water are not available  Do not touch your eyes or mouth without washing your hands first          Cover a sneeze or cough  Use a tissue that covers your mouth and nose  Put the used tissue in the trash right away  Use the bend of your arm if a tissue is not available  Wash your hands well with soap and water or use a hand   Do not stand close to anyone who is sneezing or coughing  Try to stay away from others while you are sick  This is especially important during the first 2 to 3 days when the virus is more easily spread  Wait until a fever, cough, or other symptoms are gone before you return to work or other regular activities  Do not share items while you are sick  This includes food, drinks, eating utensils, and dishes  Follow up with your doctor as directed:  Write down your questions so you remember to ask them during your visits  © Copyright [employer] [place] [employer] 2022 Information is for End User's use only and may not be sold, redistributed or otherwise used for commercial purposes  The above information is an  only  It is not intended as medical advice for individual conditions or treatments  Talk to your doctor, nurse or pharmacist before following any medical regimen to see if it is safe and effective for you

## 2023-02-20 NOTE — LETTER
February 20, 2023    Patient:  Padmini Rogers  YOB: 1965  Date of Last Encounter: 2/20/2023    To whom it may concern:    Padmini Rogers has tested negative for COVID-19 (Coronavirus)  He may return to work on 2/21/2023      Sincerely,        Rudy Thomas PA-C

## 2023-02-23 NOTE — PROGRESS NOTES
[address] [facility]        NAME: Toby Selby is a 66 y o  male  : 1965    MRN: [id number]  DATE: 2023  TIME: 12:57 PM    Assessment and Plan   Acute pharyngitis, unspecified etiology [J02 9]  1  Acute pharyngitis, unspecified etiology  Poct Covid 19 Rapid Antigen Test    POCT rapid strepA      2  Viral URI              Patient Instructions     Patient has pharyngitis/URI which I suspect is viral   Rapid COVID and strep screens were negative  Recommended hydration, rest, discussed soft diet, pain control, OTC cough and cold meds, close observation  Follow up with PCP in 3-5 days  Proceed to  ER if symptoms worsen  Chief Complaint     Chief Complaint   Patient presents with   • Sore Throat     Pt reports sore throat with general fatigue with onset of symptoms this morning  States positive exposure to Covid on Friday  Managing symptoms with cough drops  History of Present Illness       Patient presents with onset today of fatigue, sore throat as primary symptoms  Denies fever, chills, N/V/D, recent COVID exposure  Has been using cough drops  Sore Throat         Review of Systems   Review of Systems   Constitutional: Positive for fatigue  HENT: Positive for sore throat  Respiratory: Negative  Cardiovascular: Negative  Gastrointestinal: Negative  Genitourinary: Negative  Current Medications     No current outpatient medications on file      Current Allergies     Allergies as of 2023 - Reviewed 2023   Allergen Reaction Noted   • Etodolac GI Bleeding and Dizziness 2010            The following portions of the patient's history were reviewed and updated as appropriate: allergies, current medications, past family history, past medical history, past social history, past surgical history and problem list      Past Medical History:   Diagnosis Date   • Varicose veins of right lower extremity        Past Surgical History:   Procedure Laterality Date   • KNEE SURGERY Right     arthroscopic meniscus   • DE ENDOVEN ABLTJ INCMPTNT VEIN XTR LASER 1ST VEIN Right 4/19/2021    Procedure: ENDOVASCULAR LASER THERAPY (EVLT) RIGHT GREATER SAPHENOUS VEIN WITH MULTIPLE STAB PHLEBECTOMIES;  Surgeon: Duy Barrientos MD;  Location:  MAIN OR;  Service: Vascular   • SHOULDER SURGERY Right    • TOOTH EXTRACTION         Family History   Problem Relation Age of Onset   • No Known Problems Family          Medications have been verified  Objective   /64 (BP Location: Left arm, Patient Position: Sitting)   Pulse (!) 44   Temp 98 4 °F (36 9 °C)   Resp 18   Ht 5' 11" (1 803 m)   Wt 109 kg (240 lb)   SpO2 99%   BMI 33 47 kg/m²   No LMP for male patient  Physical Exam     Physical Exam  Vitals reviewed  Constitutional:       General: He is not in acute distress  Appearance: He is well-developed  HENT:      Right Ear: Tympanic membrane, ear canal and external ear normal       Left Ear: Tympanic membrane, ear canal and external ear normal       Nose: Nose normal       Mouth/Throat:      Mouth: Mucous membranes are moist       Pharynx: Posterior oropharyngeal erythema present  No oropharyngeal exudate  Tonsils: No tonsillar exudate  Cardiovascular:      Rate and Rhythm: Normal rate and regular rhythm  Heart sounds: Normal heart sounds  No murmur heard  Pulmonary:      Effort: Pulmonary effort is normal  No respiratory distress  Breath sounds: Normal breath sounds  Musculoskeletal:      Cervical back: Neck supple  Lymphadenopathy:      Cervical: No cervical adenopathy  Neurological:      Mental Status: He is alert and oriented to person, place, and time

## 2023-06-17 ENCOUNTER — HOSPITAL ENCOUNTER (EMERGENCY)
Facility: HOSPITAL | Age: 58
Discharge: HOME | End: 2023-06-17
Attending: EMERGENCY MEDICINE | Admitting: EMERGENCY MEDICINE
Payer: COMMERCIAL

## 2023-06-17 VITALS
OXYGEN SATURATION: 98 % | TEMPERATURE: 97.5 F | SYSTOLIC BLOOD PRESSURE: 128 MMHG | WEIGHT: 235 LBS | BODY MASS INDEX: 31.83 KG/M2 | HEART RATE: 50 BPM | RESPIRATION RATE: 18 BRPM | DIASTOLIC BLOOD PRESSURE: 81 MMHG | HEIGHT: 72 IN

## 2023-06-17 DIAGNOSIS — M54.50 LOW BACK PAIN: Primary | ICD-10-CM

## 2023-06-17 PROCEDURE — 96372 THER/PROPH/DIAG INJ SC/IM: CPT

## 2023-06-17 PROCEDURE — 99282 EMERGENCY DEPT VISIT SF MDM: CPT

## 2023-06-17 RX ORDER — ACETAMINOPHEN 325 MG/1
975 TABLET ORAL ONCE
Status: COMPLETED | OUTPATIENT
Start: 2023-06-17 | End: 2023-06-17

## 2023-06-17 RX ORDER — KETOROLAC TROMETHAMINE 30 MG/ML
15 INJECTION, SOLUTION INTRAMUSCULAR; INTRAVENOUS ONCE
Status: COMPLETED | OUTPATIENT
Start: 2023-06-17 | End: 2023-06-17

## 2023-06-17 RX ORDER — METHOCARBAMOL 500 MG/1
500 TABLET, FILM COATED ORAL ONCE
Status: COMPLETED | OUTPATIENT
Start: 2023-06-17 | End: 2023-06-17

## 2023-06-17 RX ORDER — METHOCARBAMOL 500 MG/1
500 TABLET, FILM COATED ORAL 2 TIMES DAILY
Qty: 20 TABLET | Refills: 0 | Status: SHIPPED | OUTPATIENT
Start: 2023-06-17

## 2023-06-17 RX ORDER — LIDOCAINE 50 MG/G
1 PATCH TOPICAL ONCE
Status: DISCONTINUED | OUTPATIENT
Start: 2023-06-17 | End: 2023-06-17 | Stop reason: HOSPADM

## 2023-06-17 RX ADMIN — METHOCARBAMOL 500 MG: 500 TABLET ORAL at 05:51

## 2023-06-17 RX ADMIN — KETOROLAC TROMETHAMINE 15 MG: 30 INJECTION, SOLUTION INTRAMUSCULAR; INTRAVENOUS at 05:51

## 2023-06-17 RX ADMIN — LIDOCAINE 1 PATCH: 50 PATCH TOPICAL at 05:51

## 2023-06-17 RX ADMIN — ACETAMINOPHEN 975 MG: 325 TABLET, FILM COATED ORAL at 05:51

## 2023-06-17 NOTE — ED PROVIDER NOTES
History  Chief Complaint   Patient presents with   • Back Pain     Lower back, denies known injury, has been having heel pain and diagnosed with plantar fasciatus, therapy over a few weeks ago but still having pain and walking off from planter problems; took ibuprofen PTA     61-year-old male with no significant past medical history presents for evaluation of right sided lower back, buttock pain, has been going on for some time however got worse recently as he has been walking differently secondary to heel pain  Saw his chiropractor last week with some relief, missed his appointment this week  No numbness or tingling of the extremities, no saddle anesthesia, no midline back pain, no specific trauma  No history of IV drug use, not on any blood thinning medications          None       Past Medical History:   Diagnosis Date   • Varicose veins of right lower extremity        Past Surgical History:   Procedure Laterality Date   • KNEE SURGERY Right     arthroscopic meniscus   • [place] ENDOVEN ABLTJ INCMPTNT VEIN XTR LASER 1ST VEIN Right 4/19/2021    Procedure: ENDOVASCULAR LASER THERAPY (EVLT) RIGHT GREATER SAPHENOUS VEIN WITH MULTIPLE STAB PHLEBECTOMIES;  Surgeon: Power Salgado MD;  Location:  MAIN OR;  Service: Vascular   • SHOULDER SURGERY Right    • TOOTH EXTRACTION         Family History   Problem Relation Age of Onset   • No Known Problems Family      I have reviewed and agree with the history as documented  E-Cigarette/Vaping   • E-Cigarette Use Never User      E-Cigarette/Vaping Substances   • Nicotine No    • THC No    • CBD No    • Flavoring No    • Other No    • Unknown No      Social History     Tobacco Use   • Smoking status: Never   • Smokeless tobacco: Never   Vaping Use   • Vaping Use: Never used   Substance Use Topics   • Alcohol use: Not Currently   • Drug use: Not Currently       Review of Systems   Constitutional: Negative for chills and fever  HENT: Negative for rhinorrhea and sore throat  Respiratory: Negative for cough and shortness of breath  Cardiovascular: Negative for chest pain and palpitations  Gastrointestinal: Negative for abdominal pain, nausea and vomiting  Genitourinary: Negative for dysuria, frequency and urgency  Musculoskeletal: Positive for back pain  All other systems reviewed and are negative  Physical Exam  Physical Exam  Vitals and nursing note reviewed  Constitutional:       Appearance: He is well-developed  HENT:      Head: Normocephalic and atraumatic  Cardiovascular:      Rate and Rhythm: Normal rate and regular rhythm  Heart sounds: Normal heart sounds  Pulmonary:      Effort: Pulmonary effort is normal       Breath sounds: Normal breath sounds  Abdominal:      General: There is no distension  Palpations: Abdomen is soft  Tenderness: There is no abdominal tenderness  Musculoskeletal:        Legs:       Comments: No midline back pain no overlying skin lesions   Skin:     General: Skin is warm and dry  Neurological:      Mental Status: He is alert and oriented to person, place, and time        Comments: Muscle strength 5 out of 5 bilateral lower extremities, normal gait, no saddle anesthesia         Vital Signs  ED Triage Vitals [06/17/23 0524]   Temperature Pulse Respirations Blood Pressure SpO2   97 5 °F (36 4 °C) (!) 50 18 128/81 98 %      Temp Source Heart Rate Source Patient Position - Orthostatic VS BP Location FiO2 (%)   Temporal Monitor Sitting Left arm --      Pain Score       10 - Worst Possible Pain           Vitals:    06/17/23 0524   BP: 128/81   Pulse: (!) 50   Patient Position - Orthostatic VS: Sitting         Visual Acuity      ED Medications  Medications   ketorolac (TORADOL) injection 15 mg (has no administration in time range)   lidocaine (LIDODERM) 5 % patch 1 patch (has no administration in time range)   acetaminophen (TYLENOL) tablet 975 mg (has no administration in time range)   methocarbamol (ROBAXIN) tablet 500 mg (has no administration in time range)       Diagnostic Studies  Results Reviewed     None                 No orders to display              Procedures  Procedures         ED Course  ED Course as of 06/17/23 0539   Sat Jun 17, 2023   1312 Orthopedics note from 4/22 reviewed, patient seen for bilateral wrist pain                                             Medical Decision Making  57-year-old male with lower back pain, no red flag symptoms for neurosurgical emergency such as epidural hematoma, cauda equina, epidural abscess, no specific trauma so low concern for fracture, likely musculoskeletal pain we will treat symptomatically    Risk  OTC drugs  Prescription drug management  Disposition  Final diagnoses:   Low back pain     Time reflects when diagnosis was documented in both MDM as applicable and the Disposition within this note     Time User Action Codes Description Comment    6/17/2023  5:35 AM Keith Grewal Add [M54 50] Low back pain       ED Disposition     ED Disposition   Discharge    Condition   Stable    Date/Time   Sat Jun 17, 2023  5:36 AM    Comment   Chava Madsen discharge to home/self care                 Follow-up Information     Follow up With Specialties Details Why Contact Info Additional Bursiljum 27, DO Family Medicine Schedule an appointment as soon as possible for a visit   [address]  [address] [address] [facility] [address] Emergency Department Emergency Medicine  If symptoms worsen [address] [place], [address]  [address] [facility] Emergency Department, [address]          Patient's Medications   Discharge Prescriptions    DICLOFENAC SODIUM (VOLTAREN) 1 %    Apply 2 g topically 4 (four) times a day       Start Date: 6/17/2023 End Date: --       Order Dose: 2 g       Quantity: 100 g    Refills: 0    METHOCARBAMOL (ROBAXIN) 500 MG TABLET    Take 1 tablet (500 mg total) by mouth 2 (two) times a day       Start Date: 6/17/2023 End Date: --       Order Dose: 500 mg       Quantity: 20 tablet    Refills: 0       No discharge procedures on file      PDMP Review     None          ED Provider  Electronically Signed by           Pamela Kaplan DO  06/17/23 5311

## 2023-06-19 ENCOUNTER — OFFICE VISIT (OUTPATIENT)
Dept: PODIATRY | Facility: CLINIC | Age: 58
End: 2023-06-19
Payer: COMMERCIAL

## 2023-06-19 VITALS
HEIGHT: 72 IN | SYSTOLIC BLOOD PRESSURE: 139 MMHG | WEIGHT: 241 LBS | HEART RATE: 49 BPM | BODY MASS INDEX: 32.64 KG/M2 | DIASTOLIC BLOOD PRESSURE: 80 MMHG

## 2023-06-19 DIAGNOSIS — M92.62 HAGLUND'S DEFORMITY OF LEFT HEEL: ICD-10-CM

## 2023-06-19 DIAGNOSIS — G57.82 SURAL NEURITIS, LEFT: Primary | ICD-10-CM

## 2023-06-19 PROCEDURE — 64450 NJX AA&/STRD OTHER PN/BRANCH: CPT | Performed by: PODIATRIST

## 2023-06-19 PROCEDURE — 99203 OFFICE O/P NEW LOW 30 MIN: CPT | Performed by: PODIATRIST

## 2023-06-19 RX ORDER — LIDOCAINE HYDROCHLORIDE 10 MG/ML
1 INJECTION, SOLUTION EPIDURAL; INFILTRATION; INTRACAUDAL; PERINEURAL ONCE
Status: COMPLETED | OUTPATIENT
Start: 2023-06-19 | End: 2023-06-19

## 2023-06-19 RX ORDER — TRIAMCINOLONE ACETONIDE 40 MG/ML
20 INJECTION, SUSPENSION INTRA-ARTICULAR; INTRAMUSCULAR ONCE
Status: COMPLETED | OUTPATIENT
Start: 2023-06-19 | End: 2023-06-19

## 2023-06-19 RX ADMIN — LIDOCAINE HYDROCHLORIDE 1 ML: 10 INJECTION, SOLUTION EPIDURAL; INFILTRATION; INTRACAUDAL; PERINEURAL at 17:00

## 2023-06-19 RX ADMIN — TRIAMCINOLONE ACETONIDE 20 MG: 40 INJECTION, SUSPENSION INTRA-ARTICULAR; INTRAMUSCULAR at 17:01

## 2023-06-19 NOTE — PROGRESS NOTES
PATIENTJanus Closs  1965       ASSESSMENT:     1  Sural neuritis, left  lidocaine (PF) (XYLOCAINE-MPF) 1 % injection 1 mL    triamcinolone acetonide (KENALOG-40) 40 mg/mL injection 20 mg    Nerve block      2  Ora's deformity of left heel                  PLAN:  1  Reviewed medical records  Patient was counseled and educated on the condition and the diagnosis  2  The diagnosis, treatment options and prognosis were discussed with the patient  3  His symptom and exam are more consistent with sural / lateral calcaneal neuritis  Treatment options were discussed and the patient wished to proceed with a nerve block  See procedure  4  Instructed supportive care, home exercise, icing, and proper footwear/ arch support  5  Patient will return in 4 weeks for re-evaluation  Imaging: I have personally reviewed pertinent films in PACS  Labs, pathology, and Other Studies: I have personally reviewed pertinent reports  Nerve block    Date/Time: 6/19/2023 3:00 PM    Performed by: Royal Espinosa DPM  Authorized by: Royal Espinosa DPM    Patient location:  Bedside  [place] Protocol:  Consent: Verbal consent obtained  Risks and benefits: risks, benefits and alternatives were discussed  Consent given by: patient  Timeout called at: 6/19/2023 3:00 PM   Patient understanding: patient states understanding of the procedure being performed  Site marked: the operative site was marked  Patient identity confirmed: verbally with patient      Indications:     Indications:  Pain relief and procedural anesthesia  Location:     Body area:  Lower extremity    Lower extremity nerve:  Sural (and lateral calcaneal nerve)    Laterality:  Left  Pre-procedure details:     Skin preparation:  Alcohol  Skin anesthesia (see MAR for exact dosages):     Skin anesthesia method:  Topical application    Topical anesthesia: Ethyl Chloride spray  Procedure details (see MAR for exact dosages):      Block needle gauge:  25 G    Anesthetic injected:  Lidocaine 1% w/o epi    Steroid injected:  Triamcinolone    Injection procedure:  Anatomic landmarks identified and anatomic landmarks palpated  Post-procedure details:     Dressing:  Sterile dressing    Outcome:  Anesthesia achieved    Patient tolerance of procedure: Tolerated well, no immediate complications        Subjective:     HPI  The patient presents with chief complaint of pain around lateral left heel  He has burning and shooting sensation  He had it for about 3 weeks  He denied any injury  He was seen by another podiatrist and sent for PT  PT did not help him  There is no swelling  No dysfunction  The following portions of the patient's history were reviewed and updated as appropriate: allergies, current medications, past family history, past medical history, past social history, past surgical history and problem list   All pertinent labs and images were reviewed  Past Medical History  Past Medical History:   Diagnosis Date   • Varicose veins of right lower extremity        Past Surgical History  Past Surgical History:   Procedure Laterality Date   • KNEE SURGERY Right     arthroscopic meniscus   • [place] ENDOVEN ABLTJ INCMPTNT VEIN XTR LASER 1ST VEIN Right 4/19/2021    Procedure: ENDOVASCULAR LASER THERAPY (EVLT) RIGHT GREATER SAPHENOUS VEIN WITH MULTIPLE STAB PHLEBECTOMIES;  Surgeon: Fern Boston MD;  Location: [facility]  Service: Vascular   • SHOULDER SURGERY Right    • TOOTH EXTRACTION          Allergies:  Etodolac    Medications:  Current Outpatient Medications   Medication Sig Dispense Refill   • Diclofenac Sodium (VOLTAREN) 1 % Apply 2 g topically 4 (four) times a day 100 g 0   • methocarbamol (ROBAXIN) 500 mg tablet Take 1 tablet (500 mg total) by mouth 2 (two) times a day 20 tablet 0     No current facility-administered medications for this visit         Social History:  Social History     Socioeconomic History   • Marital status: Single     Spouse name: None   • Number of children: None   • Years of education: None   • Highest education level: None   Occupational History   • None   Tobacco Use   • Smoking status: Never   • Smokeless tobacco: Never   Vaping Use   • Vaping Use: Never used   Substance and Sexual Activity   • Alcohol use: Not Currently   • Drug use: Not Currently   • Sexual activity: Yes     Partners: Female   Other Topics Concern   • None   Social History Narrative   • None     Social Determinants of Health     Financial Resource Strain: Not on file   Food Insecurity: Not on file   Transportation Needs: Not on file   Physical Activity: Not on file   Stress: Not on file   Social Connections: Not on file   Intimate Partner Violence: Not on file   Housing Stability: Not on file          Review of Systems   Constitutional: Negative for chills and fever  Respiratory: Negative for cough and shortness of breath  Cardiovascular: Negative for chest pain  Gastrointestinal: Negative for nausea and vomiting  Musculoskeletal: Negative for gait problem  Skin: Negative for wound  Neurological: Negative for weakness and numbness  Hematological: Negative  Psychiatric/Behavioral: Negative for behavioral problems and confusion  Objective:      /80   Pulse (!) 49   Ht 6' (1 829 m)   Wt 109 kg (241 lb)   BMI 32 69 kg/m²          Physical Exam  Vitals reviewed  Constitutional:       General: He is not in acute distress  Appearance: He is not toxic-appearing or diaphoretic  HENT:      Head: Normocephalic and atraumatic  Eyes:      Extraocular Movements: Extraocular movements intact  Cardiovascular:      Rate and Rhythm: Normal rate and regular rhythm  Pulses: Normal pulses  Dorsalis pedis pulses are 2+ on the right side and 2+ on the left side  Posterior tibial pulses are 2+ on the right side and 2+ on the left side  Pulmonary:      Effort: Pulmonary effort is normal  No respiratory distress  Musculoskeletal:         General: Tenderness present  No swelling or signs of injury  Cervical back: Normal range of motion and neck supple  Right lower leg: No edema  Left lower leg: No edema  Right foot: No foot drop  Left foot: No foot drop  Comments: Tenderness along the course of sural nerve / lateral calcaneal nerve left lateral heel with mild Tinel sign  Hypertrophy of left posterior lateral heel without pain  Skin:     General: Skin is warm  Capillary Refill: Capillary refill takes less than 2 seconds  Coloration: Skin is not cyanotic or mottled  Findings: No abscess, ecchymosis, erythema or wound  Nails: There is no clubbing  Neurological:      General: No focal deficit present  Mental Status: He is alert and oriented to person, place, and time  Cranial Nerves: No cranial nerve deficit  Sensory: No sensory deficit  Motor: No weakness  Coordination: Coordination normal    Psychiatric:         Mood and Affect: Mood normal          Behavior: Behavior normal          Thought Content:  Thought content normal          Judgment: Judgment normal

## 2023-07-17 ENCOUNTER — OFFICE VISIT (OUTPATIENT)
Dept: PODIATRY | Facility: CLINIC | Age: 58
End: 2023-07-17
Payer: COMMERCIAL

## 2023-07-17 VITALS
HEIGHT: 72 IN | DIASTOLIC BLOOD PRESSURE: 69 MMHG | BODY MASS INDEX: 32.64 KG/M2 | SYSTOLIC BLOOD PRESSURE: 122 MMHG | WEIGHT: 241 LBS | HEART RATE: 48 BPM

## 2023-07-17 DIAGNOSIS — M92.62 HAGLUND'S DEFORMITY OF LEFT HEEL: ICD-10-CM

## 2023-07-17 DIAGNOSIS — M72.2 PLANTAR FASCIITIS: ICD-10-CM

## 2023-07-17 DIAGNOSIS — G57.82 SURAL NEURITIS, LEFT: Primary | ICD-10-CM

## 2023-07-17 PROCEDURE — 20550 NJX 1 TENDON SHEATH/LIGAMENT: CPT | Performed by: PODIATRIST

## 2023-07-17 PROCEDURE — 99213 OFFICE O/P EST LOW 20 MIN: CPT | Performed by: PODIATRIST

## 2023-07-17 RX ORDER — TRIAMCINOLONE ACETONIDE 40 MG/ML
20 INJECTION, SUSPENSION INTRA-ARTICULAR; INTRAMUSCULAR ONCE
Status: COMPLETED | OUTPATIENT
Start: 2023-07-17 | End: 2023-07-17

## 2023-07-17 RX ORDER — LIDOCAINE HYDROCHLORIDE 10 MG/ML
2 INJECTION, SOLUTION EPIDURAL; INFILTRATION; INTRACAUDAL; PERINEURAL ONCE
Status: COMPLETED | OUTPATIENT
Start: 2023-07-17 | End: 2023-07-17

## 2023-07-17 RX ADMIN — LIDOCAINE HYDROCHLORIDE 2 ML: 10 INJECTION, SOLUTION EPIDURAL; INFILTRATION; INTRACAUDAL; PERINEURAL at 17:33

## 2023-07-17 RX ADMIN — TRIAMCINOLONE ACETONIDE 20 MG: 40 INJECTION, SUSPENSION INTRA-ARTICULAR; INTRAMUSCULAR at 17:34

## 2023-07-17 NOTE — PROGRESS NOTES
Luis Alberto Bello  1965       ASSESSMENT:     1. Sural neuritis, left        2. Plantar fasciitis  triamcinolone acetonide (KENALOG-40) 40 mg/mL injection 20 mg    lidocaine (PF) (XYLOCAINE-MPF) 1 % injection 2 mL    Foot injection      3. Ora's deformity of left heel                  PLAN:  1. Reviewed medical records. Patient was counseled and educated on the condition and the diagnosis. 2. The diagnosis, treatment options and prognosis were discussed with the patient. 3. His pain is more localized around lateral tuberosity of left calcaneus. Treatment options were discussed and the patient wished to proceed with a trigger point injection. See procedure. 4. Instructed supportive care, home exercise, icing, and proper footwear/ arch support. 5. Patient will return in 4 weeks for re-evaluation. Imaging: I have personally reviewed pertinent films in PACS  Labs, pathology, and Other Studies: I have personally reviewed pertinent reports. Foot injection     Date/Time 7/17/2023 4:40 PM     Performed by  Althea Nichols DPM   Authorized by Althea Nichols DPM     Universal Protocol   Consent: Verbal consent obtained. Risks and benefits: risks, benefits and alternatives were discussed  Consent given by: patient  Time out: Immediately prior to procedure a "time out" was called to verify the correct patient, procedure, equipment, support staff and site/side marked as required. Timeout called at: 7/17/2023 4:40 PM.  Patient understanding: patient states understanding of the procedure being performed  Site marked: the operative site was marked  Patient identity confirmed: verbally with patient       Procedure Details   Procedure Notes:   Treatment options were discussed and the patient wished to proceed with an injection. A trigger point injection was given to left plantar lateral  heel using 0.5cc Kenolog 40 and 2cc 1% Lidocaine pl.     Instructed supportive care, icing, and resting. Patient tolerance: Patient tolerated the procedure well with no immediate complications             Subjective:     HPI  The patient presents for follow-up on left ankle/foot pain. His pain has been much better. Still has some pain on left heel after being on his feet. There is no swelling. No dysfunction. The following portions of the patient's history were reviewed and updated as appropriate: allergies, current medications, past family history, past medical history, past social history, past surgical history and problem list.  All pertinent labs and images were reviewed.       Past Medical History  Past Medical History:   Diagnosis Date   • Varicose veins of right lower extremity        Past Surgical History  Past Surgical History:   Procedure Laterality Date   • KNEE SURGERY Right     arthroscopic meniscus   • [place] ENDOVEN ABLTJ INCMPTNT VEIN XTR LASER 1ST VEIN Right 4/19/2021    Procedure: ENDOVASCULAR LASER THERAPY (EVLT) RIGHT GREATER SAPHENOUS VEIN WITH MULTIPLE STAB PHLEBECTOMIES;  Surgeon: Khadijah Villavicencio MD;  Location:  MAIN OR;  Service: Vascular   • SHOULDER SURGERY Right    • TOOTH EXTRACTION          Allergies:  Etodolac    Medications:  Current Outpatient Medications   Medication Sig Dispense Refill   • Diclofenac Sodium (VOLTAREN) 1 % Apply 2 g topically 4 (four) times a day 100 g 0   • methocarbamol (ROBAXIN) 500 mg tablet Take 1 tablet (500 mg total) by mouth 2 (two) times a day 20 tablet 0     Current Facility-Administered Medications   Medication Dose Route Frequency Provider Last Rate Last Admin   • lidocaine (PF) (XYLOCAINE-MPF) 1 % injection 2 mL  2 mL Injection Once Moises Sahu DPM       • triamcinolone acetonide (KENALOG-40) 40 mg/mL injection 20 mg  20 mg Subcutaneous Once Moises Sahu DPM           Social History:  Social History     Socioeconomic History   • Marital status: Single     Spouse name: None   • Number of children: None   • Years of education: None   • Highest education level: None   Occupational History   • None   Tobacco Use   • Smoking status: Never   • Smokeless tobacco: Never   Vaping Use   • Vaping Use: Never used   Substance and Sexual Activity   • Alcohol use: Not Currently   • Drug use: Not Currently   • Sexual activity: Yes     Partners: Female   Other Topics Concern   • None   Social History Narrative   • None     Social Determinants of Health     Financial Resource Strain: Not on file   Food Insecurity: Not on file   Transportation Needs: Not on file   Physical Activity: Not on file   Stress: Not on file   Social Connections: Not on file   Intimate Partner Violence: Not on file   Housing Stability: Not on file          Review of Systems   Constitutional: Negative for chills and fever. Respiratory: Negative for cough and shortness of breath. Cardiovascular: Negative for chest pain. Gastrointestinal: Negative for nausea and vomiting. Musculoskeletal: Negative for gait problem. Neurological: Negative for weakness. Objective:      /69   Pulse (!) 48   Ht 6' (1.829 m)   Wt 109 kg (241 lb)   BMI 32.69 kg/m²          Physical Exam  Vitals reviewed. Constitutional:       General: He is not in acute distress. Appearance: He is not toxic-appearing or diaphoretic. Cardiovascular:      Rate and Rhythm: Normal rate and regular rhythm. Pulses: Normal pulses. Dorsalis pedis pulses are 2+ on the right side and 2+ on the left side. Posterior tibial pulses are 2+ on the right side and 2+ on the left side. Pulmonary:      Effort: Pulmonary effort is normal. No respiratory distress. Musculoskeletal:         General: Tenderness present. No swelling or signs of injury. Right lower leg: No edema. Left lower leg: No edema. Right foot: No foot drop. Left foot: No foot drop. Comments: Minimal tenderness along the course of sural nerve / lateral calcaneal nerve left lateral heel.   Slight Tinel sign noted left plantar lateral heel. Hypertrophy of left posterior lateral heel without pain. Focal pain presents left plantar lateral heel around lateral tuberosity of calcaneus. Skin:     General: Skin is warm. Capillary Refill: Capillary refill takes less than 2 seconds. Coloration: Skin is not cyanotic or mottled. Findings: No abscess, ecchymosis, erythema or wound. Nails: There is no clubbing. Neurological:      General: No focal deficit present. Mental Status: He is alert and oriented to person, place, and time. Cranial Nerves: No cranial nerve deficit. Sensory: No sensory deficit. Motor: No weakness. Coordination: Coordination normal.   Psychiatric:         Mood and Affect: Mood normal.         Behavior: Behavior normal.         Thought Content:  Thought content normal.         Judgment: Judgment normal.

## 2023-08-14 ENCOUNTER — OFFICE VISIT (OUTPATIENT)
Dept: PODIATRY | Facility: CLINIC | Age: 58
End: 2023-08-14
Payer: COMMERCIAL

## 2023-08-14 VITALS
BODY MASS INDEX: 32.64 KG/M2 | DIASTOLIC BLOOD PRESSURE: 80 MMHG | HEIGHT: 72 IN | SYSTOLIC BLOOD PRESSURE: 124 MMHG | HEART RATE: 42 BPM | WEIGHT: 241 LBS

## 2023-08-14 DIAGNOSIS — G57.82 SURAL NEURITIS, LEFT: ICD-10-CM

## 2023-08-14 DIAGNOSIS — M72.2 PLANTAR FASCIITIS: Primary | ICD-10-CM

## 2023-08-14 PROCEDURE — 99212 OFFICE O/P EST SF 10 MIN: CPT | Performed by: PODIATRIST

## 2023-08-14 NOTE — PROGRESS NOTES
PATIENT:  Pardeep Olea  1965       ASSESSMENT:     1. Plantar fasciitis        2. Sural neuritis, left                  PLAN:  1. Patient was counseled and educated on the condition and the diagnosis. 2. He responded to the treatment well. Instructed to continue supportive care, home exercise, icing, and proper footwear/ arch support. 5. Patient to return if his condition gets worse in the next few weeks. Imaging: I have personally reviewed pertinent films in PACS  Labs, pathology, and Other Studies: I have personally reviewed pertinent reports. Procedures    Subjective:     HPI  The patient presents for follow-up on left ankle/foot pain. Pain is minimal at this time. No new complaint. There is no swelling. No dysfunction. The following portions of the patient's history were reviewed and updated as appropriate: allergies, current medications, past family history, past medical history, past social history, past surgical history and problem list.  All pertinent labs and images were reviewed.       Past Medical History  Past Medical History:   Diagnosis Date   • Varicose veins of right lower extremity        Past Surgical History  Past Surgical History:   Procedure Laterality Date   • KNEE SURGERY Right     arthroscopic meniscus   • [place] ENDOVEN ABLTJ INCMPTNT VEIN XTR LASER 1ST VEIN Right 4/19/2021    Procedure: ENDOVASCULAR LASER THERAPY (EVLT) RIGHT GREATER SAPHENOUS VEIN WITH MULTIPLE STAB PHLEBECTOMIES;  Surgeon: Liset Cardoso MD;  Location:  MAIN OR;  Service: Vascular   • SHOULDER SURGERY Right    • TOOTH EXTRACTION          Allergies:  Etodolac    Medications:  Current Outpatient Medications   Medication Sig Dispense Refill   • Diclofenac Sodium (VOLTAREN) 1 % Apply 2 g topically 4 (four) times a day 100 g 0   • methocarbamol (ROBAXIN) 500 mg tablet Take 1 tablet (500 mg total) by mouth 2 (two) times a day 20 tablet 0     No current facility-administered medications for this visit. Social History:  Social History     Socioeconomic History   • Marital status: Single     Spouse name: None   • Number of children: None   • Years of education: None   • Highest education level: None   Occupational History   • None   Tobacco Use   • Smoking status: Never   • Smokeless tobacco: Never   Vaping Use   • Vaping Use: Never used   Substance and Sexual Activity   • Alcohol use: Not Currently   • Drug use: Not Currently   • Sexual activity: Yes     Partners: Female   Other Topics Concern   • None   Social History Narrative   • None     Social Determinants of Health     Financial Resource Strain: Not on file   Food Insecurity: Not on file   Transportation Needs: Not on file   Physical Activity: Not on file   Stress: Not on file   Social Connections: Not on file   Intimate Partner Violence: Not on file   Housing Stability: Not on file          Review of Systems   Constitutional: Negative for chills and fever. Respiratory: Negative for cough and shortness of breath. Cardiovascular: Negative for chest pain. Gastrointestinal: Negative for nausea and vomiting. Musculoskeletal: Negative for gait problem. Neurological: Negative for weakness. Objective:      /80   Pulse (!) 42   Ht 6' (1.829 m)   Wt 109 kg (241 lb)   BMI 32.69 kg/m²          Physical Exam  Vitals reviewed. Constitutional:       General: He is not in acute distress. Appearance: He is not toxic-appearing or diaphoretic. Cardiovascular:      Rate and Rhythm: Normal rate and regular rhythm. Pulses: Normal pulses. Dorsalis pedis pulses are 2+ on the right side and 2+ on the left side. Posterior tibial pulses are 2+ on the right side and 2+ on the left side. Pulmonary:      Effort: Pulmonary effort is normal. No respiratory distress. Musculoskeletal:         General: No swelling, tenderness or signs of injury. Right lower leg: No edema. Left lower leg: No edema. Right foot: No foot drop. Left foot: No foot drop. Comments: No pain along the course of sural nerve / lateral calcaneal nerve left lateral heel. No pain presents left plantar lateral heel around lateral tuberosity of calcaneus. Skin:     General: Skin is warm. Capillary Refill: Capillary refill takes less than 2 seconds. Coloration: Skin is not cyanotic or mottled. Findings: No abscess, ecchymosis, erythema or wound. Nails: There is no clubbing. Neurological:      General: No focal deficit present. Mental Status: He is alert and oriented to person, place, and time. Cranial Nerves: No cranial nerve deficit. Sensory: No sensory deficit. Motor: No weakness. Coordination: Coordination normal.   Psychiatric:         Mood and Affect: Mood normal.         Behavior: Behavior normal.         Thought Content:  Thought content normal.         Judgment: Judgment normal.

## 2024-02-13 ENCOUNTER — OFFICE VISIT (OUTPATIENT)
Dept: URGENT CARE | Facility: CLINIC | Age: 59
End: 2024-02-13
Payer: COMMERCIAL

## 2024-02-13 VITALS
OXYGEN SATURATION: 97 % | RESPIRATION RATE: 18 BRPM | HEART RATE: 49 BPM | SYSTOLIC BLOOD PRESSURE: 128 MMHG | TEMPERATURE: 97.7 F | DIASTOLIC BLOOD PRESSURE: 78 MMHG

## 2024-02-13 DIAGNOSIS — L03.113 CELLULITIS OF RIGHT FOREARM: Primary | ICD-10-CM

## 2024-02-13 PROCEDURE — 99213 OFFICE O/P EST LOW 20 MIN: CPT | Performed by: PHYSICIAN ASSISTANT

## 2024-02-13 RX ORDER — CEPHALEXIN 500 MG/1
500 CAPSULE ORAL EVERY 6 HOURS SCHEDULED
Qty: 28 CAPSULE | Refills: 0 | Status: SHIPPED | OUTPATIENT
Start: 2024-02-13 | End: 2024-02-20

## 2024-02-13 NOTE — PROGRESS NOTES
[facility]        NAME: Maicol Wiley is a 59 y.o. male  : 1965    MRN: [id number]  DATE: 2024  TIME: 5:20 PM    Assessment and Plan   Cellulitis of right forearm [L03.113]  1. Cellulitis of right forearm  cephalexin (KEFLEX) 500 mg capsule            Patient Instructions       Follow up with PCP in 3-5 days.  Proceed to  ER if symptoms worsen.    Chief Complaint     Chief Complaint   Patient presents with    Rash     Pt states he started with a painful lump/rash on his right forearm in which he noticed it today. No known injury, unsure if he was bit by a bug.         History of Present Illness       Patient presents with a rash over his right forearm starting earlier today.  States it is a little sensitive to touch but denies any itching or any injury to the area.        Review of Systems   Review of Systems   Constitutional:  Negative for fatigue and fever.   Skin:  Positive for rash.         Current Medications       Current Outpatient Medications:     cephalexin (KEFLEX) 500 mg capsule, Take 1 capsule (500 mg total) by mouth every 6 (six) hours for 7 days, Disp: 28 capsule, Rfl: 0    Diclofenac Sodium (VOLTAREN) 1 %, Apply 2 g topically 4 (four) times a day, Disp: 100 g, Rfl: 0    methocarbamol (ROBAXIN) 500 mg tablet, Take 1 tablet (500 mg total) by mouth 2 (two) times a day, Disp: 20 tablet, Rfl: 0    Current Allergies     Allergies as of 2024 - Reviewed 2024   Allergen Reaction Noted    Etodolac GI Bleeding and Dizziness 2010            The following portions of the patient's history were reviewed and updated as appropriate: allergies, current medications, past family history, past medical history, past social history, past surgical history and problem list.     Past Medical History:   Diagnosis Date    Varicose veins of right lower extremity        Past Surgical History:   Procedure Laterality Date    KNEE SURGERY Right     arthroscopic meniscus    [place] ENDOVEN  ABLTJ INCMPTNT VEIN XTR LASER 1ST VEIN Right 4/19/2021    Procedure: ENDOVASCULAR LASER THERAPY (EVLT) RIGHT GREATER SAPHENOUS VEIN WITH MULTIPLE STAB PHLEBECTOMIES;  Surgeon: Hollie Chris MD;  Location:  MAIN OR;  Service: Vascular    SHOULDER SURGERY Right     TOOTH EXTRACTION         Family History   Problem Relation Age of Onset    No Known Problems Family          Medications have been verified.        Objective   /78   Pulse (!) 49   Temp 97.7 °F (36.5 °C)   Resp 18   SpO2 97%   No LMP for male patient.       Physical Exam     Physical Exam  Constitutional:       Appearance: Normal appearance.   Skin:            Comments: 4 to 5 cm area of erythema is well-demarcated and warm to the touch.  No fluctuance or drainage noted.  No scaling, central clearing or vesicles noted.   Neurological:      Mental Status: He is alert.   Psychiatric:         Mood and Affect: Mood normal.         Behavior: Behavior normal.

## (undated) DEVICE — STANDARD SURGICAL GOWN, L: Brand: CONVERTORS

## (undated) DEVICE — TIBURON SPLIT SHEET: Brand: CONVERTORS

## (undated) DEVICE — CHLORAPREP HI-LITE 26ML ORANGE

## (undated) DEVICE — STOPCOCK 3-WAY

## (undated) DEVICE — BETHLEHEM UNIVERSAL MINOR GEN: Brand: CARDINAL HEALTH

## (undated) DEVICE — DRAPE SHEET THREE QUARTER

## (undated) DEVICE — SYRINGE 20ML LL

## (undated) DEVICE — INTENDED FOR TISSUE SEPARATION, AND OTHER PROCEDURES THAT REQUIRE A SHARP SURGICAL BLADE TO PUNCTURE OR CUT.: Brand: BARD-PARKER SAFETY BLADES SIZE 11, STERILE

## (undated) DEVICE — COBAN 4 IN STERILE

## (undated) DEVICE — GLOVE SRG BIOGEL ECLIPSE 6

## (undated) DEVICE — FIBER PROC KIT GOLD TIP 21G 45CM NEVERTOUCH

## (undated) DEVICE — GLOVE INDICATOR PI UNDERGLOVE SZ 6.5 BLUE

## (undated) DEVICE — TONGUE DEPRESSOR STERILE

## (undated) DEVICE — COVER PROBE INTRAOPERATIVE 6 X 96 IN

## (undated) DEVICE — Device: Brand: MEDEX

## (undated) DEVICE — KERLIX BANDAGE ROLL: Brand: KERLIX

## (undated) DEVICE — ACE WRAP 6 IN STERILE

## (undated) DEVICE — 3M™ STERI-STRIP™ REINFORCED ADHESIVE SKIN CLOSURES, R1547, 1/2 IN X 4 IN (12 MM X 100 MM), 6 STRIPS/ENVELOPE: Brand: 3M™ STERI-STRIP™

## (undated) DEVICE — GLOVE SRG BIOGEL 6.5

## (undated) DEVICE — ULTRASOUND GEL STERILE FOIL PK

## (undated) DEVICE — ACE WRAP 4 IN STERILE